# Patient Record
Sex: FEMALE | Employment: FULL TIME | URBAN - METROPOLITAN AREA
[De-identification: names, ages, dates, MRNs, and addresses within clinical notes are randomized per-mention and may not be internally consistent; named-entity substitution may affect disease eponyms.]

---

## 2024-06-04 ENCOUNTER — TELEPHONE (OUTPATIENT)
Dept: LACTATION | Facility: CLINIC | Age: 39
End: 2024-06-04

## 2024-07-06 ENCOUNTER — LAB (OUTPATIENT)
Dept: LAB | Facility: LAB | Age: 39
End: 2024-07-06
Payer: COMMERCIAL

## 2024-07-06 DIAGNOSIS — Z3A.27 27 WEEKS GESTATION OF PREGNANCY (HHS-HCC): ICD-10-CM

## 2024-07-06 LAB
ABO GROUP (TYPE) IN BLOOD: NORMAL
ANTIBODY SCREEN: NORMAL
GLUCOSE 1H P 50 G GLC PO SERPL-MCNC: 189 MG/DL
HBV SURFACE AG SERPL QL IA: NONREACTIVE
HCT VFR BLD AUTO: 33 % (ref 36–46)
HCV AB SER QL: NONREACTIVE
HGB BLD-MCNC: 10.8 G/DL (ref 12–16)
HIV 1+2 AB+HIV1 P24 AG SERPL QL IA: NONREACTIVE
RH FACTOR (ANTIGEN D): NORMAL
TSH SERPL-ACNC: 1.33 MIU/L (ref 0.44–3.98)

## 2024-07-06 PROCEDURE — 85018 HEMOGLOBIN: CPT

## 2024-07-06 PROCEDURE — 86850 RBC ANTIBODY SCREEN: CPT

## 2024-07-06 PROCEDURE — 86900 BLOOD TYPING SEROLOGIC ABO: CPT

## 2024-07-06 PROCEDURE — 86317 IMMUNOASSAY INFECTIOUS AGENT: CPT

## 2024-07-06 PROCEDURE — 86780 TREPONEMA PALLIDUM: CPT

## 2024-07-06 PROCEDURE — 86803 HEPATITIS C AB TEST: CPT

## 2024-07-06 PROCEDURE — 85014 HEMATOCRIT: CPT

## 2024-07-06 PROCEDURE — 87340 HEPATITIS B SURFACE AG IA: CPT

## 2024-07-06 PROCEDURE — 87389 HIV-1 AG W/HIV-1&-2 AB AG IA: CPT

## 2024-07-06 PROCEDURE — 36415 COLL VENOUS BLD VENIPUNCTURE: CPT

## 2024-07-06 PROCEDURE — 86901 BLOOD TYPING SEROLOGIC RH(D): CPT

## 2024-07-06 PROCEDURE — 82947 ASSAY GLUCOSE BLOOD QUANT: CPT

## 2024-07-06 PROCEDURE — 84443 ASSAY THYROID STIM HORMONE: CPT

## 2024-07-07 LAB
RUBV IGG SERPL IA-ACNC: 0.8 IA
RUBV IGG SERPL QL IA: NORMAL
TREPONEMA PALLIDUM IGG+IGM AB [PRESENCE] IN SERUM OR PLASMA BY IMMUNOASSAY: NONREACTIVE

## 2024-07-08 DIAGNOSIS — R73.09 GLUCOSE TOLERANCE TEST ABNORMAL: Primary | ICD-10-CM

## 2024-07-18 ENCOUNTER — APPOINTMENT (OUTPATIENT)
Dept: OBSTETRICS AND GYNECOLOGY | Facility: CLINIC | Age: 39
End: 2024-07-18
Payer: COMMERCIAL

## 2024-07-18 ENCOUNTER — HOSPITAL ENCOUNTER (OUTPATIENT)
Dept: RADIOLOGY | Facility: CLINIC | Age: 39
Discharge: HOME | End: 2024-07-18
Payer: COMMERCIAL

## 2024-07-18 VITALS — BODY MASS INDEX: 25.63 KG/M2 | DIASTOLIC BLOOD PRESSURE: 68 MMHG | WEIGHT: 154 LBS | SYSTOLIC BLOOD PRESSURE: 121 MMHG

## 2024-07-18 DIAGNOSIS — Z3A.30 30 WEEKS GESTATION OF PREGNANCY (HHS-HCC): Primary | ICD-10-CM

## 2024-07-18 DIAGNOSIS — R73.09 GLUCOSE TOLERANCE TEST ABNORMAL: ICD-10-CM

## 2024-07-18 DIAGNOSIS — Z3A.23 23 WEEKS GESTATION OF PREGNANCY (HHS-HCC): ICD-10-CM

## 2024-07-18 DIAGNOSIS — O09.513 SUPERVISION OF ELDERLY PRIMIGRAVIDA, THIRD TRIMESTER (HHS-HCC): ICD-10-CM

## 2024-07-18 PROCEDURE — 76816 OB US FOLLOW-UP PER FETUS: CPT

## 2024-07-18 PROCEDURE — 0501F PRENATAL FLOW SHEET: CPT | Performed by: OBSTETRICS & GYNECOLOGY

## 2024-07-18 PROCEDURE — 76819 FETAL BIOPHYS PROFIL W/O NST: CPT

## 2024-07-18 PROCEDURE — 76816 OB US FOLLOW-UP PER FETUS: CPT | Performed by: OBSTETRICS & GYNECOLOGY

## 2024-07-18 PROCEDURE — 76819 FETAL BIOPHYS PROFIL W/O NST: CPT | Performed by: OBSTETRICS & GYNECOLOGY

## 2024-07-19 ENCOUNTER — APPOINTMENT (OUTPATIENT)
Dept: OBSTETRICS AND GYNECOLOGY | Facility: CLINIC | Age: 39
End: 2024-07-19
Payer: COMMERCIAL

## 2024-07-19 ENCOUNTER — LAB (OUTPATIENT)
Dept: LAB | Facility: LAB | Age: 39
End: 2024-07-19
Payer: COMMERCIAL

## 2024-07-19 DIAGNOSIS — R73.09 GLUCOSE TOLERANCE TEST ABNORMAL: ICD-10-CM

## 2024-07-19 LAB
GLUCOSE 1H P 100 G GLC PO SERPL-MCNC: 189 MG/DL
GLUCOSE 2H P 100 G GLC PO SERPL-MCNC: 160 MG/DL
GLUCOSE 3H P 100 G GLC PO SERPL-MCNC: 88 MG/DL
GLUCOSE P FAST SERPL-MCNC: 91 MG/DL

## 2024-07-19 PROCEDURE — 36415 COLL VENOUS BLD VENIPUNCTURE: CPT

## 2024-07-19 PROCEDURE — 82950 GLUCOSE TEST: CPT

## 2024-07-19 PROCEDURE — 82951 GLUCOSE TOLERANCE TEST (GTT): CPT

## 2024-07-19 PROCEDURE — 82947 ASSAY GLUCOSE BLOOD QUANT: CPT

## 2024-07-19 PROCEDURE — 82952 GTT-ADDED SAMPLES: CPT

## 2024-07-29 ENCOUNTER — APPOINTMENT (OUTPATIENT)
Dept: OBSTETRICS AND GYNECOLOGY | Facility: CLINIC | Age: 39
End: 2024-07-29
Payer: COMMERCIAL

## 2024-07-29 VITALS — WEIGHT: 159 LBS | DIASTOLIC BLOOD PRESSURE: 57 MMHG | BODY MASS INDEX: 26.46 KG/M2 | SYSTOLIC BLOOD PRESSURE: 103 MMHG

## 2024-07-29 DIAGNOSIS — Z3A.31 PREGNANCY WITH 31 COMPLETED WEEKS GESTATION (HHS-HCC): ICD-10-CM

## 2024-07-29 DIAGNOSIS — O24.410 DIET CONTROLLED GESTATIONAL DIABETES MELLITUS (GDM) IN THIRD TRIMESTER (HHS-HCC): Primary | ICD-10-CM

## 2024-07-29 PROCEDURE — 0501F PRENATAL FLOW SHEET: CPT | Performed by: OBSTETRICS & GYNECOLOGY

## 2024-07-29 RX ORDER — FERROUS SULFATE 325(65) MG
65 TABLET, DELAYED RELEASE (ENTERIC COATED) ORAL EVERY OTHER DAY
COMMUNITY

## 2024-07-29 RX ORDER — LANCETS 26 GAUGE
EACH MISCELLANEOUS
Qty: 1 EACH | Refills: 0 | Status: SHIPPED | OUTPATIENT
Start: 2024-07-29 | End: 2025-07-29

## 2024-07-29 RX ORDER — DEXTROSE 4 G
TABLET,CHEWABLE ORAL
Qty: 321.429 EACH | Refills: 1 | Status: SHIPPED | OUTPATIENT
Start: 2024-07-29

## 2024-07-31 ENCOUNTER — TELEPHONE (OUTPATIENT)
Dept: MATERNAL FETAL MEDICINE | Facility: CLINIC | Age: 39
End: 2024-07-31
Payer: COMMERCIAL

## 2024-07-31 NOTE — TELEPHONE ENCOUNTER
Called to schedule Nutrition consult. Left voicemail message with office contact information: [phone].

## 2024-08-01 ENCOUNTER — TELEPHONE (OUTPATIENT)
Dept: MATERNAL FETAL MEDICINE | Facility: CLINIC | Age: 39
End: 2024-08-01
Payer: COMMERCIAL

## 2024-08-02 ENCOUNTER — NUTRITION (OUTPATIENT)
Dept: OBSTETRICS AND GYNECOLOGY | Facility: CLINIC | Age: 39
End: 2024-08-02
Payer: COMMERCIAL

## 2024-08-02 NOTE — PROGRESS NOTES
Nutrition Assessment     Reason for Visit:  Maria T Negrete is a 39 y.o. female who presents for Gestational Diabetes       Food And Nutrient Intake:  Food and Nutrient History  Food and Nutrient History: Telephone Nutrition consult today. No fingersticks yet--supplies called in today. Planning to  tonight.          OB Nutrition Intake  Weeks of Gestation: 32-1  Pre-Pregnancy BMI: 20.8  Overall Weight Change in Pregnancy: 34 lbs  Assessment of Weight Change: Within target range.       Nutrition Diagnosis      Nutrition Diagnosis  Patient has Nutrition Diagnosis: Yes  Nutrition Diagnosis 1: Food and nutrition related knowledge deficit  Related to (1): lack of prior/recent nutrition counseling r/t BG control in pregnancy  As Evidenced by (1): intial nutrition consult    Nutrition Interventions/Recommendations   Food and Nutrition Delivery  Meals & Snacks: Carbohydrate-modified diet  Goals: Recommended Daily carbohydrate distribution:    Breakfast: 1-2 carbohydrate servings (15-30g CHO) --  avoid concentrated sweets/high GI foods: fruit, fruit juices, cold cereal and milk, syrup, jams and jellies, etc.  AM Snack:  1-2 servings   Lunch:  3-4 servings (45-60g CHO)  Midday Snack:  1-2 servings   Dinner: 3-4 servings   HS Snack:  2 servings (30g)    -Be sure to add protein food to carb choices at each meal and snack: cheese, meat, eggs, nuts, peanut butter, etc    -During the day, eat about every 2 - 4 hours.     -Avoid more than 8-10 hours overnight without eating. If more than about 2hrs between dinner and bedtime, have a carb plus protein snack.     -Higher fiber carbohydrates/whole grains are preferred over refined carb choices.    -- Avoid juices and sugar-sweetened beverages. Diet drinks are fine. Try eliminating milk at mealtime if noticing after-meal BG elevations. Unsweetened almond or soymilk beverages are a lower-carb dairy alternative.    -Recommended Dietary Allowance for carbohydrates during pregnancy  is a minimum of 175g/day (11-12 servings) to provide 33g/day for fetal brain development. Many women do well eating a bit less than 175g CHO/day--this is fine, as long as there is no purposeful, severe restriction of carb foods at meals and snacks (ex: Keto or Atkins-style diets for BG control) and no symptoms indicating inadequate carb intake-- loss of weight, feeling dizzy/lightheaded, irritable, confused, excessively hungry, etc.    Education Materials emailed: Diet for GDM; Sample Meals and Snacks; Yogurt Options      Nutrition Monitoring and Evaluation   Biochemical Data, Medical Tests and Procedures  Monitoring and Evaluation Plan: Glucose/endocrine profile  Glucose/Endocrine Profile: Glucose, fasting, Other (Comment)  Criteria: Fasting BG 95 or less; 1hr post-meal  or less. Blood [facility]. Follow-up as needed to assess goal attainment. Modifications based on further assessment and self-blood glucose monitoring results. Pt expresses understanding and agreement.          Time Spent  Prep time on day of patient encounter: 0 minutes  Time spent directly with patient, family or caregiver: 32 minutes  Additional Time Spent on Patient Care Activities: 3 minutes  Documentation Time: 10 minutes  Other Time Spent: 0 minutes  Total: 45 minutes

## 2024-08-14 ENCOUNTER — APPOINTMENT (OUTPATIENT)
Dept: OBSTETRICS AND GYNECOLOGY | Facility: CLINIC | Age: 39
End: 2024-08-14
Payer: COMMERCIAL

## 2024-08-14 VITALS — WEIGHT: 161.2 LBS | BODY MASS INDEX: 26.83 KG/M2 | SYSTOLIC BLOOD PRESSURE: 116 MMHG | DIASTOLIC BLOOD PRESSURE: 78 MMHG

## 2024-08-14 DIAGNOSIS — Z13.79 GENETIC SCREENING: ICD-10-CM

## 2024-08-14 DIAGNOSIS — Z3A.33 33 WEEKS GESTATION OF PREGNANCY (HHS-HCC): Primary | ICD-10-CM

## 2024-08-14 DIAGNOSIS — Z23 NEED FOR TDAP VACCINATION: ICD-10-CM

## 2024-08-14 DIAGNOSIS — Z71.85 ENCOUNTER FOR IMMUNIZATION SAFETY COUNSELING: ICD-10-CM

## 2024-08-14 PROBLEM — O24.410 DIET CONTROLLED GESTATIONAL DIABETES MELLITUS (GDM), ANTEPARTUM (HHS-HCC): Status: ACTIVE | Noted: 2024-08-14

## 2024-08-14 PROBLEM — O09.519 ADVANCED MATERNAL AGE, PRIMIGRAVIDA, ANTEPARTUM (HHS-HCC): Status: ACTIVE | Noted: 2024-08-14

## 2024-08-14 PROCEDURE — 0501F PRENATAL FLOW SHEET: CPT | Performed by: OBSTETRICS & GYNECOLOGY

## 2024-08-14 PROCEDURE — 90471 IMMUNIZATION ADMIN: CPT | Performed by: OBSTETRICS & GYNECOLOGY

## 2024-08-14 PROCEDURE — 90715 TDAP VACCINE 7 YRS/> IM: CPT | Performed by: OBSTETRICS & GYNECOLOGY

## 2024-08-14 ASSESSMENT — ENCOUNTER SYMPTOMS
CONSTITUTIONAL NEGATIVE: 0
PSYCHIATRIC NEGATIVE: 0
NEUROLOGICAL NEGATIVE: 0
ENDOCRINE NEGATIVE: 0
CARDIOVASCULAR NEGATIVE: 0
EYES NEGATIVE: 0
GASTROINTESTINAL NEGATIVE: 0
RESPIRATORY NEGATIVE: 0
ALLERGIC/IMMUNOLOGIC NEGATIVE: 0
MUSCULOSKELETAL NEGATIVE: 0
HEMATOLOGIC/LYMPHATIC NEGATIVE: 0

## 2024-08-14 ASSESSMENT — PATIENT HEALTH QUESTIONNAIRE - PHQ9
2. FEELING DOWN, DEPRESSED OR HOPELESS: NOT AT ALL
SUM OF ALL RESPONSES TO PHQ9 QUESTIONS 1 AND 2: 0
1. LITTLE INTEREST OR PLEASURE IN DOING THINGS: NOT AT ALL

## 2024-08-14 NOTE — PROGRESS NOTES
Meets nutritionist on Friday. She has been checking her blood sugars at home-- blood sugars fasting have been 88-90 and 1-hour have all been <140. Pt has ultrasound scheduled 7/29/2024.    Revewed prenatal labs to date-she desires and does not remember being offered cffDNA.  Will send today.    Problem List Items Addressed This Visit    None  Visit Diagnoses       33 weeks gestation of pregnancy ([facility])    -  Primary    Genetic screening        Relevant Orders    [facility] Prenatal Screen    Encounter for immunization safety counseling        Need for Tdap vaccination

## 2024-08-16 ENCOUNTER — INITIAL PRENATAL (OUTPATIENT)
Dept: MATERNAL FETAL MEDICINE | Facility: CLINIC | Age: 39
End: 2024-08-16
Payer: COMMERCIAL

## 2024-08-16 VITALS — SYSTOLIC BLOOD PRESSURE: 120 MMHG | DIASTOLIC BLOOD PRESSURE: 68 MMHG

## 2024-08-16 DIAGNOSIS — O24.410 DIET CONTROLLED GESTATIONAL DIABETES MELLITUS (GDM), ANTEPARTUM (HHS-HCC): Primary | ICD-10-CM

## 2024-08-16 DIAGNOSIS — Z3A.34 34 WEEKS GESTATION OF PREGNANCY (HHS-HCC): ICD-10-CM

## 2024-08-16 DIAGNOSIS — O24.410 DIET CONTROLLED GESTATIONAL DIABETES MELLITUS (GDM) IN THIRD TRIMESTER (HHS-HCC): ICD-10-CM

## 2024-08-16 PROCEDURE — 99245 OFF/OP CONSLTJ NEW/EST HI 55: CPT | Performed by: NURSE PRACTITIONER

## 2024-08-16 NOTE — ASSESSMENT & PLAN NOTE
Patient was recently diagnosed with gestational diabetes (GDM).  We discussed the pregnancy implications of the diagnosis including increased risk of pre-eclampsia, LGA/macrosomia, shoulder dystocia, stillbirth as well as  hypoglycemia, hyperbilirubinemia and respiratory distress.  We reviewed the importance of glycemic control and its impact on lowering these risks.  Discussed management ranging from dietary changes to pharmacotherapy and that insulin is considered first line therapy rather than oral agents if medication is ultimately needed.  Discussed our recommendation for serial growth ultrasounds and starting  testing at 32 weeks if treatment is required.  We also stressed the potential persistence of impaired glucose tolerance post pregnancy in up to 30% of patients and 50% risk of IGT/T2DM in the next 10 years with an overall lifetime risk of T2DM of 70%. We reviewed the recommendation for postpartum screening at 6 weeks post-partum (can be performed as soon as 2 days after delivery) and, if normal, routine screening every 1-3 years. We did discuss that a healthy diet and maintenance of a normal body weight may reduce those risks    In summary the following is recommended:    1. We will continue to co-manage diabetes via the Bloodsugar line with weekly assessment of glycemic control.  Current regimen is: nutrition   2. We will plan for a follow up [facility] visit at 36 weeks to assess overall control and provide delivery timing recommendations.  3. Recommend serial growth ultrasounds every 4 weeks starting at 28 weeks gestation.  4. Weekly  testing is recommended starting at 32 weeks IF medication is required OR there is a LGA growth pattern.  Twice weekly testing is recommended at 32 weeks if control is suboptimal, there is polyhydramnios, or medication is required AND there is a LGA growth pattern.  5. Delivery is recommended at 39 weeks, though if glycemic control is suboptimal then  delivery at 37-39 weeks may be considered.  6. If the EFW is >4500g at the time of delivery  should be considered.  7. A 2hr GTT is recommended 6 weeks postpartum (can be performed as soon as 2 days postpartum), if normal then screening for T2DM is recommended at least every 3 years.  8. Prior to scheduled delivery the following is recommended regarding insulin management:

## 2024-08-16 NOTE — PROGRESS NOTES
Maria T Negrete is a 39 y.o. here for MFM consultation at 34w1d for GDM, referred by Dr. Clancy     Overall pt reports, she is feeling well.     Pt has been checking BG for the last couple weeks. Reviewed BG log and more than 80% within goal range currently.      issues:  GDM    ObHx-  GynHx-regular periods prior to pregnancy  MedHx-denies   SurgHx-denies   FamHx-multiple family members witg diabetes   SocHx-denies tobacco, ETOH, or illicit drugs   All-NKDA  Meds-PNV, FeSO4 qod     Visit Vitals  /68   LMP 2023   OB Status Pregnant   Smoking Status Never      Gen-NAD  Cardiac- good peripheral perfusion  Respiratory- non-labored breathing  Abdomen- soft, non-tender, gravid   Extremities- symmetrical   Pelvic- deferred      Sono-  Lat US : EFW 1538g (46%), AC 58%    Problem List Items Addressed This Visit          Pregnancy    Diet controlled gestational diabetes mellitus (GDM), antepartum ([facility]) - Primary    Overview     -Shared Care with Dr. Clancy/Cesilia  - MFM Consult completed-   -MFM 36 wk visit  -Serial growth ultrasounds starting at 28 weeks    Last ultrasound: - EFW 46%, AC 58%--> will reschedule growth for ASAP (org. )    Fetal surveillance: if medication initiated   -Weekly at 32 weeks  -Twice weekly at 36 weeks    Current Regimen: nutrition    Delivery Plan: pending 36 week follow up visit  Intrapartum:  GDM protocol  Postpartum: No medication    Recommend PP 2hr gtt and q3yr F/U with PCP for A1C and TSH     24 BG log reviewed and more than 80% within goal range--> nutrition            Current Assessment & Plan     Patient was recently diagnosed with gestational diabetes (GDM).  We discussed the pregnancy implications of the diagnosis including increased risk of pre-eclampsia, LGA/macrosomia, shoulder dystocia, stillbirth as well as  hypoglycemia, hyperbilirubinemia and respiratory distress.  We reviewed the importance of glycemic control and its  impact on lowering these risks.  Discussed management ranging from dietary changes to pharmacotherapy and that insulin is considered first line therapy rather than oral agents if medication is ultimately needed.  Discussed our recommendation for serial growth ultrasounds and starting  testing at 32 weeks if treatment is required.  We also stressed the potential persistence of impaired glucose tolerance post pregnancy in up to 30% of patients and 50% risk of IGT/T2DM in the next 10 years with an overall lifetime risk of T2DM of 70%. We reviewed the recommendation for postpartum screening at 6 weeks post-partum (can be performed as soon as 2 days after delivery) and, if normal, routine screening every 1-3 years. We did discuss that a healthy diet and maintenance of a normal body weight may reduce those risks    In summary the following is recommended:    1. We will continue to co-manage diabetes via the Bloodsugar line with weekly assessment of glycemic control.  Current regimen is: nutrition   2. We will plan for a follow up [facility] visit at 36 weeks to assess overall control and provide delivery timing recommendations.  3. Recommend serial growth ultrasounds every 4 weeks starting at 28 weeks gestation.  4. Weekly  testing is recommended starting at 32 weeks IF medication is required OR there is a LGA growth pattern.  Twice weekly testing is recommended at 32 weeks if control is suboptimal, there is polyhydramnios, or medication is required AND there is a LGA growth pattern.  5. Delivery is recommended at 39 weeks, though if glycemic control is suboptimal then delivery at 37-39 weeks may be considered.  6. If the EFW is >4500g at the time of delivery  should be considered.  7. A 2hr GTT is recommended 6 weeks postpartum (can be performed as soon as 2 days postpartum), if normal then screening for T2DM is recommended at least every 3 years.  8. Prior to scheduled delivery the following is  recommended regarding insulin management:           34 weeks gestation of pregnancy ([facility])    Overview     - Primary OB- Dr. Clancy          Other Visit Diagnoses       Diet controlled gestational diabetes mellitus (GDM) in third trimester ([facility])                 MFM F/U x2 wks for delivery planning   Reschedule growth from 8/29 to soonest available     BAR Loya-CNP  [employer]   Maternal Fetal Medicine    I spent 55 minutes in consultation with pt coordinating m

## 2024-08-20 ENCOUNTER — TELEPHONE (OUTPATIENT)
Dept: MATERNAL FETAL MEDICINE | Facility: HOSPITAL | Age: 39
End: 2024-08-20
Payer: COMMERCIAL

## 2024-08-20 NOTE — TELEPHONE ENCOUNTER
Blood Sugar Support Line Communication   Patient is new to the Bloods Sugar Support Line    Communicated with the patient on 8/20/2024   She has Gestational Diabetes @ 34w5d    The patient checks her sugars fasting and 1 hour after meals. Her current regimen is as follows:  Nutrition plan      The patient's reported blood sugars appear well controlled, with 80% within the goal range. Goal range glucose is Fasting <95, 1 hr after meals <140.     Is limiting variety to ensure goal range BG.  Provided education -  that while optimizing glucose levels to goal range is a key component of Gestational Diabetes care, significantly lower glucose levels and/or carbohydrate restriction offers no advantage. A balanced nutrition plan of nutrients, including modest carbohydrate intake is recommended.    No changes to her current treatment plan are indicated at this time.    Patient understands to submit sugar log for review weekly through the Blood Sugar Line @ [phone] or via email to [email or web address] to help optimize glucose control.    I spent approximately 8-10 minutes on the phone with the patient

## 2024-08-21 ENCOUNTER — HOSPITAL ENCOUNTER (OUTPATIENT)
Dept: RADIOLOGY | Facility: CLINIC | Age: 39
Discharge: HOME | End: 2024-08-21
Payer: COMMERCIAL

## 2024-08-21 DIAGNOSIS — O24.410 DIET CONTROLLED GESTATIONAL DIABETES MELLITUS (GDM) IN THIRD TRIMESTER (HHS-HCC): ICD-10-CM

## 2024-08-21 PROCEDURE — 76816 OB US FOLLOW-UP PER FETUS: CPT | Performed by: OBSTETRICS & GYNECOLOGY

## 2024-08-21 PROCEDURE — 76819 FETAL BIOPHYS PROFIL W/O NST: CPT

## 2024-08-21 PROCEDURE — 76816 OB US FOLLOW-UP PER FETUS: CPT

## 2024-08-21 PROCEDURE — 76819 FETAL BIOPHYS PROFIL W/O NST: CPT | Performed by: OBSTETRICS & GYNECOLOGY

## 2024-08-27 ENCOUNTER — PATIENT MESSAGE (OUTPATIENT)
Dept: MATERNAL FETAL MEDICINE | Facility: HOSPITAL | Age: 39
End: 2024-08-27
Payer: COMMERCIAL

## 2024-08-29 ENCOUNTER — APPOINTMENT (OUTPATIENT)
Dept: MATERNAL FETAL MEDICINE | Facility: HOSPITAL | Age: 39
End: 2024-08-29
Payer: COMMERCIAL

## 2024-08-29 ENCOUNTER — APPOINTMENT (OUTPATIENT)
Dept: OBSTETRICS AND GYNECOLOGY | Facility: CLINIC | Age: 39
End: 2024-08-29
Payer: COMMERCIAL

## 2024-08-29 ENCOUNTER — APPOINTMENT (OUTPATIENT)
Dept: RADIOLOGY | Facility: CLINIC | Age: 39
End: 2024-08-29
Payer: COMMERCIAL

## 2024-08-29 VITALS — BODY MASS INDEX: 26.96 KG/M2 | DIASTOLIC BLOOD PRESSURE: 64 MMHG | SYSTOLIC BLOOD PRESSURE: 106 MMHG | WEIGHT: 162 LBS

## 2024-08-29 DIAGNOSIS — O24.410 DIET CONTROLLED GESTATIONAL DIABETES MELLITUS (GDM) IN THIRD TRIMESTER (HHS-HCC): ICD-10-CM

## 2024-08-29 DIAGNOSIS — Z3A.36 36 WEEKS GESTATION OF PREGNANCY (HHS-HCC): Primary | ICD-10-CM

## 2024-08-29 PROCEDURE — 87081 CULTURE SCREEN ONLY: CPT

## 2024-08-29 PROCEDURE — 0501F PRENATAL FLOW SHEET: CPT | Performed by: OBSTETRICS & GYNECOLOGY

## 2024-09-01 LAB — GP B STREP GENITAL QL CULT: NORMAL

## 2024-09-03 ENCOUNTER — PATIENT MESSAGE (OUTPATIENT)
Dept: MATERNAL FETAL MEDICINE | Facility: CLINIC | Age: 39
End: 2024-09-03
Payer: COMMERCIAL

## 2024-09-03 ENCOUNTER — APPOINTMENT (OUTPATIENT)
Dept: MATERNAL FETAL MEDICINE | Facility: CLINIC | Age: 39
End: 2024-09-03
Payer: COMMERCIAL

## 2024-09-03 ENCOUNTER — ROUTINE PRENATAL (OUTPATIENT)
Dept: MATERNAL FETAL MEDICINE | Facility: CLINIC | Age: 39
End: 2024-09-03
Payer: COMMERCIAL

## 2024-09-03 VITALS — BODY MASS INDEX: 27.12 KG/M2 | WEIGHT: 163 LBS | DIASTOLIC BLOOD PRESSURE: 75 MMHG | SYSTOLIC BLOOD PRESSURE: 111 MMHG

## 2024-09-03 DIAGNOSIS — Z3A.36 36 WEEKS GESTATION OF PREGNANCY (HHS-HCC): Primary | ICD-10-CM

## 2024-09-03 DIAGNOSIS — O24.410 DIET CONTROLLED GESTATIONAL DIABETES MELLITUS (GDM), ANTEPARTUM (HHS-HCC): ICD-10-CM

## 2024-09-03 DIAGNOSIS — O09.519 ADVANCED MATERNAL AGE, PRIMIGRAVIDA, ANTEPARTUM (HHS-HCC): ICD-10-CM

## 2024-09-03 PROCEDURE — 99214 OFFICE O/P EST MOD 30 MIN: CPT | Performed by: NURSE PRACTITIONER

## 2024-09-03 NOTE — PROGRESS NOTES
Maria T Negrete is a 39 y.o. here for MFM F/U consultation at 36w5d for GDM, referred by Dr. Clancy     Overall pt reports, she is feeling well. Denies VB, LOF, or CTXs.     Pt has been checking BG four times daily. Reviewed BG log and more than 80% within goal range currently.      issues:  GDM- nutrition controlled       Visit Vitals  /75   Wt 73.9 kg (163 lb)   LMP 2023   BMI 27.12 kg/m²   OB Status Pregnant   Smoking Status Never   BSA 1.84 m²      Gen-NAD  Cardiac- good peripheral perfusion  Respiratory- non-labored breathing  Abdomen- soft, non-tender, gravid   Extremities- symmetrical   Pelvic- deferred      Sono-  : EFW 64%, AC 85%, BPP - see report for full details  Lat  : EFW 1538g (46%), AC 58%    Problem List Items Addressed This Visit          Pregnancy    36 weeks gestation of pregnancy ([facility]) - Primary    Overview     - Primary OB- Dr. Clancy  -Counseled on RSV vaccine today (9/3) given close to point where she would not be able to receive this. Pt is considering. Will think about it. Aware that tomorrow () would be her last day eligible for vaccine.   -Desires to breastfeed   -Delivery planning- recommend 39 wk delivery. Pt asking for possibility of  delivery. Counseled on MOD and risk vs benefits of vaginal vs primary C/D           Current Assessment & Plan     - upon request: Reviewed and counseled on risk, benefits, and alternatives --> especially discussed recommended MOD is vaginal and  increases risk for infection.   -Pt considering  as option and will discuss with primary OB provider           Advanced maternal age, primigravida, antepartum ([facility])    Overview     cffDNA at 34 week visit  Weekly  testing now until delivery given AMA          Diet controlled gestational diabetes mellitus (GDM), antepartum ([facility])    Overview     -Shared Care with Dr. Clancy/Cesilia  - MFM Consult completed-   -MFM 36 wk  visit  -Serial growth ultrasounds starting at 28 weeks    Last ultrasound: :  EFW 64%, AC 85%, BPP - EFW 46%, AC 58%--> will reschedule growth for ASAP (org. )    Fetal surveillance: if medication initiated   -Weekly at 32 weeks  -Twice weekly at 36 weeks    Current Regimen: nutrition    Delivery Plan: Recommend 39 wk delivery- MOD --> pt considering primary . See counseling   Intrapartum:  GDM protocol  Postpartum: No medication    Recommend PP 2hr gtt and q3yr F/U with PCP for A1C and TSH     24 BG log reviewed and more than 80% within goal range--> nutrition   2024 Nutrition plan    2024 Nutrition plan    9/3/2024 : nutrition                 Continued weekly communication with blood sugar line   -Delivery recommended at 39 wks - considering MOD (see counseling above)  -Weekly NST until delivery recommended given BAR Al-CNP  [employer]   Maternal Fetal Medicine

## 2024-09-03 NOTE — ASSESSMENT & PLAN NOTE
- upon request: Reviewed and counseled on risk, benefits, and alternatives --> especially discussed recommended MOD is vaginal and  increases risk for infection.   -Pt considering  as option and will discuss with primary OB provider

## 2024-09-04 ENCOUNTER — ROUTINE PRENATAL (OUTPATIENT)
Dept: OBSTETRICS AND GYNECOLOGY | Facility: CLINIC | Age: 39
End: 2024-09-04
Payer: COMMERCIAL

## 2024-09-04 ENCOUNTER — APPOINTMENT (OUTPATIENT)
Dept: OBSTETRICS AND GYNECOLOGY | Facility: CLINIC | Age: 39
End: 2024-09-04
Payer: COMMERCIAL

## 2024-09-04 VITALS — BODY MASS INDEX: 27.62 KG/M2 | DIASTOLIC BLOOD PRESSURE: 77 MMHG | SYSTOLIC BLOOD PRESSURE: 133 MMHG | WEIGHT: 166 LBS

## 2024-09-04 DIAGNOSIS — O24.410 DIET CONTROLLED GESTATIONAL DIABETES MELLITUS (GDM), ANTEPARTUM (HHS-HCC): ICD-10-CM

## 2024-09-04 DIAGNOSIS — Z3A.36 36 WEEKS GESTATION OF PREGNANCY (HHS-HCC): ICD-10-CM

## 2024-09-04 DIAGNOSIS — O09.519 ADVANCED MATERNAL AGE, PRIMIGRAVIDA, ANTEPARTUM (HHS-HCC): ICD-10-CM

## 2024-09-04 PROCEDURE — 0501F PRENATAL FLOW SHEET: CPT | Performed by: OBSTETRICS & GYNECOLOGY

## 2024-09-04 PROCEDURE — 59025 FETAL NON-STRESS TEST: CPT | Performed by: OBSTETRICS & GYNECOLOGY

## 2024-09-04 ASSESSMENT — ENCOUNTER SYMPTOMS
LOSS OF SENSATION IN FEET: 0
DEPRESSION: 0
OCCASIONAL FEELINGS OF UNSTEADINESS: 0

## 2024-09-04 NOTE — PROGRESS NOTES
Pt saw Aneta Jackson yesterday-- continues to be diet controlled with good glucose control.  NST performed and reactive today-- will continue weekly for AMA.    Last ultrasound 2024-- EFW 64%.    We spent additional time reviewing the risks/benefits of IOL and planned vaginal delivery vs primary elective  delivery.  Discussed that planned vaginal delivery is associated with less blood loss, lower pain, faster recovery, and less TTN of the , while potentially increasing the risk of pelvic floor dysfunction/future incontinence.  Primary LTCS associated with increased risks of the above and perhaps lower incidence of pelvic floor dysfunction. Also discussed impact of future fertility plans and size of family given the risks of subsequent  deliveries.  Pt well informed and will consider her options-- will schedule IOL or LTCS at next visit.    Problem List Items Addressed This Visit          Pregnancy    Diet controlled gestational diabetes mellitus (GDM), antepartum ([facility])    Overview     -Shared Care with Dr. Clancy/Cesilia  - [facility] Consult completed-   -[facility] 36 wk visit  -Serial growth ultrasounds starting at 28 weeks    Last ultrasound: :  EFW 64%, AC 85%, BPP - EFW 46%, AC 58%--> will reschedule growth for ASAP (org. )    Fetal surveillance: if medication initiated   -Weekly at 32 weeks  -Twice weekly at 36 weeks    Current Regimen: nutrition    Delivery Plan: Recommend 39 wk delivery- MOD --> pt considering primary . See counseling   Intrapartum:  GDM protocol  Postpartum: No medication    Recommend PP 2hr gtt and q3yr F/U with PCP for A1C and TSH     24 BG log reviewed and more than 80% within goal range--> nutrition   2024 Nutrition plan    2024 Nutrition plan    9/3/2024 : nutrition           Advanced maternal age, primigravida, antepartum ([facility])    Overview     cffDNA at 34 week visit  Weekly  testing now until delivery given  AMA          36 weeks gestation of pregnancy ([facility])    Overview     - Primary OB- Dr. Clancy  -Counseled on RSV vaccine today (9/3) given close to point where she would not be able to receive this. Pt is considering. Will think about it. Aware that tomorrow () would be her last day eligible for vaccine.   -Desires to breastfeed   -Delivery planning- recommend 39 wk delivery. Pt asking for possibility of  delivery. Counseled on MOD and risk vs benefits of vaginal vs primary C/D (see note 2024)-- pt considering    Desired provider in labor: [] CNM  [] Physician  [x] Blood Products: [x] Yes, accepts [] No, needs counseling  [x] Initial BMI: 20.80   [x] Prenatal Labs: completed  [x] Cervical Cancer Screening up to date Needs PP PAP (last PAP  ASCUS +HPV)  [x] Rh status:   A pos  [x] Genetic Screening:  did not complete cffDNA  [x] NT US: (11-13 wks) too late  [x] Baby ASA (if indicated): not offered  [x] Pregnancy dated by: LMP and ultrasound in [place] at 19 weeks    [x] Anatomy US: (19-20 wks): normal  [] Federal Sterilization consent signed (if indicated):  [x] 1hr GCT at 24-28wks: 189  3-hour GTT 90/189/160/88  [x] Rhogam (if indicated):  NA  [x] Fetal Surveillance (if indicated): weekly NSTs for AMA  [x] Tdap (27-32 wks, may be given up to 36 wks if initial window missed): given 2024  [x] RSV (32-36 wks) (Sept. to end ):  NA  [] Flu Vaccine:    [x] Breastfeeding: yes  [] Postpartum Birth control method:   [x] GBS at 36 - 37 wks: neg  [x] 39 weeks discussion of IOL vs. Expectant management: 39 weeks  [] Mode of delivery ( anticipated ):

## 2024-09-06 ENCOUNTER — APPOINTMENT (OUTPATIENT)
Dept: MATERNAL FETAL MEDICINE | Facility: CLINIC | Age: 39
End: 2024-09-06
Payer: COMMERCIAL

## 2024-09-10 ENCOUNTER — APPOINTMENT (OUTPATIENT)
Dept: OBSTETRICS AND GYNECOLOGY | Facility: CLINIC | Age: 39
End: 2024-09-10
Payer: COMMERCIAL

## 2024-09-10 ENCOUNTER — TELEPHONE (OUTPATIENT)
Dept: OBSTETRICS AND GYNECOLOGY | Facility: CLINIC | Age: 39
End: 2024-09-10

## 2024-09-10 ENCOUNTER — PATIENT MESSAGE (OUTPATIENT)
Dept: MATERNAL FETAL MEDICINE | Facility: HOSPITAL | Age: 39
End: 2024-09-10

## 2024-09-10 VITALS — BODY MASS INDEX: 27.67 KG/M2 | SYSTOLIC BLOOD PRESSURE: 112 MMHG | DIASTOLIC BLOOD PRESSURE: 65 MMHG | WEIGHT: 166.3 LBS

## 2024-09-10 DIAGNOSIS — Z3A.37 37 WEEKS GESTATION OF PREGNANCY (HHS-HCC): Primary | ICD-10-CM

## 2024-09-10 DIAGNOSIS — O24.410 DIET CONTROLLED GESTATIONAL DIABETES MELLITUS (GDM), ANTEPARTUM (HHS-HCC): ICD-10-CM

## 2024-09-10 DIAGNOSIS — O09.519 ADVANCED MATERNAL AGE, PRIMIGRAVIDA, ANTEPARTUM (HHS-HCC): ICD-10-CM

## 2024-09-10 PROCEDURE — 0501F PRENATAL FLOW SHEET: CPT | Performed by: OBSTETRICS & GYNECOLOGY

## 2024-09-10 PROCEDURE — 59025 FETAL NON-STRESS TEST: CPT | Performed by: OBSTETRICS & GYNECOLOGY

## 2024-09-10 NOTE — PROGRESS NOTES
Pt feeling well.  Reports all blood sugars are in target range. NST reactive.  Reviewed delivery planning and she would like IOL in 39th weeks-- scheduling task sent to Fiona/Aydee.  O next week for visit/NST.    Problem List Items Addressed This Visit          Pregnancy    Diet controlled gestational diabetes mellitus (GDM), antepartum ([facility])    Overview     -Shared Care with Dr. Clancy/Strand  - MFM Consult completed-   -MFM 36 wk visit  -Serial growth ultrasounds starting at 28 weeks    Last ultrasound: :  EFW 64%, AC 85%, BPP - EFW 46%, AC 58%--> will reschedule growth for ASAP (org. )    Fetal surveillance: if medication initiated   -Weekly at 32 weeks  -Twice weekly at 36 weeks    Current Regimen: nutrition    Delivery Plan: Recommend 39 wk delivery- MOD --> pt considering primary . See counseling   Intrapartum:  GDM protocol  Postpartum: No medication    Recommend PP 2hr gtt and q3yr F/U with PCP for A1C and TSH     24 BG log reviewed and more than 80% within goal range--> nutrition   2024 Nutrition plan    2024 Nutrition plan    9/3/2024 : nutrition           Relevant Orders    Labor Induction    Advanced maternal age, primigravida, antepartum ([facility])    Overview     cffDNA at 34 week visit  Weekly  testing now until delivery given AMA          Relevant Orders    Labor Induction    37 weeks gestation of pregnancy ([facility]) - Primary    Overview     - Primary OB- Dr. Clancy  -Counseled on RSV vaccine today (9/3) given close to point where she would not be able to receive this. Pt is considering. Will think about it. Aware that tomorrow () would be her last day eligible for vaccine.   -Desires to breastfeed   -Delivery planning- recommend 39 wk delivery. Pt asking for possibility of  delivery. Counseled on MOD and risk vs benefits of vaginal vs primary C/D (see note 2024)-- pt considering    Desired provider in labor: [] CNM  []  Physician  [x] Blood Products: [x] Yes, accepts [] No, needs counseling  [x] Initial BMI: 20.80   [x] Prenatal Labs: completed  [x] Cervical Cancer Screening up to date Needs PP PAP (last PAP 2018 ASCUS +HPV)  [x] Rh status:   A pos  [x] Genetic Screening:  did not complete cffDNA  [x] NT US: (11-13 wks) too late  [x] Baby ASA (if indicated): not offered  [x] Pregnancy dated by: LMP and ultrasound in [place] at 19 weeks    [x] Anatomy US: (19-20 wks): normal  [] Federal Sterilization consent signed (if indicated):  [x] 1hr GCT at 24-28wks: 189  3-hour GTT 90/189/160/88  [x] Rhogam (if indicated):  NA  [x] Fetal Surveillance (if indicated): weekly NSTs for AMA  [x] Tdap (27-32 wks, may be given up to 36 wks if initial window missed): given 8/14/2024  [x] RSV (32-36 wks) (Sept. to end of Jan):  NA  [] Flu Vaccine:    [x] Breastfeeding: yes  [] Postpartum Birth control method:   [x] GBS at 36 - 37 wks: neg  [x] 39 weeks discussion of IOL vs. Expectant management: IOL scheduled for Chaitanya 9/23 at 0800  [] Mode of delivery ( anticipated ):

## 2024-09-10 NOTE — TELEPHONE ENCOUNTER
Called induction scheduling to change patient's scheduled induction time  Identified patient by name and MRN  Patient moved to 8pm on Monday,  at [facility].    Called patient to inform her of time change  Identified by name and   Informed patient of change  Patient verbalized understanding, no questions/concerns at this time.    LESLIE ReichN RN      Maria T Negrete Do [address] Clinical Support Staff  Phone Number: [phone]     Hi Dr. Lomas,    Thank You! Is it possible to have an evening induction time for the same day?    MD Thu Krishna, RN  Phone Number: [phone]     Yes-- that is fine. I might also check with Fiona and Aydee but I think they said they had 1800 and 2000 open that day, so whatever is fine with me!

## 2024-09-12 ENCOUNTER — APPOINTMENT (OUTPATIENT)
Dept: OBSTETRICS AND GYNECOLOGY | Facility: CLINIC | Age: 39
End: 2024-09-12
Payer: COMMERCIAL

## 2024-09-16 ENCOUNTER — APPOINTMENT (OUTPATIENT)
Dept: OBSTETRICS AND GYNECOLOGY | Facility: CLINIC | Age: 39
End: 2024-09-16
Payer: COMMERCIAL

## 2024-09-16 VITALS — BODY MASS INDEX: 27.46 KG/M2 | DIASTOLIC BLOOD PRESSURE: 67 MMHG | WEIGHT: 165 LBS | SYSTOLIC BLOOD PRESSURE: 108 MMHG

## 2024-09-16 DIAGNOSIS — Z3A.38 38 WEEKS GESTATION OF PREGNANCY (HHS-HCC): Primary | ICD-10-CM

## 2024-09-16 DIAGNOSIS — O24.410 DIET CONTROLLED GESTATIONAL DIABETES MELLITUS (GDM) IN THIRD TRIMESTER (HHS-HCC): ICD-10-CM

## 2024-09-16 PROCEDURE — 99213 OFFICE O/P EST LOW 20 MIN: CPT | Performed by: OBSTETRICS & GYNECOLOGY

## 2024-09-19 NOTE — PROGRESS NOTES
Patient is 38 weeks 4 days she is here for prenatal visit she reports good fetal movements NST today reactive  Patient is scheduled for induction next week  Patient suffers from diabetes well-controlled diabetes

## 2024-09-21 ENCOUNTER — HOSPITAL ENCOUNTER (INPATIENT)
Facility: HOSPITAL | Age: 39
End: 2024-09-21
Attending: STUDENT IN AN ORGANIZED HEALTH CARE EDUCATION/TRAINING PROGRAM | Admitting: STUDENT IN AN ORGANIZED HEALTH CARE EDUCATION/TRAINING PROGRAM
Payer: COMMERCIAL

## 2024-09-21 ENCOUNTER — ANESTHESIA (OUTPATIENT)
Dept: OBSTETRICS AND GYNECOLOGY | Facility: HOSPITAL | Age: 39
End: 2024-09-21
Payer: COMMERCIAL

## 2024-09-21 ENCOUNTER — ANESTHESIA EVENT (OUTPATIENT)
Dept: OBSTETRICS AND GYNECOLOGY | Facility: HOSPITAL | Age: 39
End: 2024-09-21
Payer: COMMERCIAL

## 2024-09-21 PROBLEM — Z3A.38 38 WEEKS GESTATION OF PREGNANCY (HHS-HCC): Status: ACTIVE | Noted: 2024-08-16

## 2024-09-21 PROBLEM — Z37.9 NORMAL LABOR (HHS-HCC): Status: ACTIVE | Noted: 2024-09-21

## 2024-09-21 LAB
ABO GROUP (TYPE) IN BLOOD: NORMAL
ANTIBODY SCREEN: NORMAL
ERYTHROCYTE [DISTWIDTH] IN BLOOD BY AUTOMATED COUNT: 13.2 % (ref 11.5–14.5)
GLUCOSE BLD MANUAL STRIP-MCNC: 101 MG/DL (ref 74–99)
GLUCOSE BLD MANUAL STRIP-MCNC: 121 MG/DL (ref 74–99)
GLUCOSE BLD MANUAL STRIP-MCNC: 169 MG/DL (ref 74–99)
GLUCOSE BLD MANUAL STRIP-MCNC: 92 MG/DL (ref 74–99)
HCT VFR BLD AUTO: 39 % (ref 36–46)
HGB BLD-MCNC: 13.1 G/DL (ref 12–16)
MCH RBC QN AUTO: 30.3 PG (ref 26–34)
MCHC RBC AUTO-ENTMCNC: 33.6 G/DL (ref 32–36)
MCV RBC AUTO: 90 FL (ref 80–100)
NRBC BLD-RTO: 0 /100 WBCS (ref 0–0)
PLATELET # BLD AUTO: 216 X10*3/UL (ref 150–450)
RBC # BLD AUTO: 4.32 X10*6/UL (ref 4–5.2)
RH FACTOR (ANTIGEN D): NORMAL
WBC # BLD AUTO: 12.2 X10*3/UL (ref 4.4–11.3)

## 2024-09-21 PROCEDURE — 2500000002 HC RX 250 W HCPCS SELF ADMINISTERED DRUGS (ALT 637 FOR MEDICARE OP, ALT 636 FOR OP/ED): Performed by: STUDENT IN AN ORGANIZED HEALTH CARE EDUCATION/TRAINING PROGRAM

## 2024-09-21 PROCEDURE — 2500000004 HC RX 250 GENERAL PHARMACY W/ HCPCS (ALT 636 FOR OP/ED): Performed by: NURSE ANESTHETIST, CERTIFIED REGISTERED

## 2024-09-21 PROCEDURE — 82947 ASSAY GLUCOSE BLOOD QUANT: CPT

## 2024-09-21 PROCEDURE — 86780 TREPONEMA PALLIDUM: CPT | Mod: AHULAB | Performed by: STUDENT IN AN ORGANIZED HEALTH CARE EDUCATION/TRAINING PROGRAM

## 2024-09-21 PROCEDURE — 51701 INSERT BLADDER CATHETER: CPT

## 2024-09-21 PROCEDURE — 3700000014 EPIDURAL BLOCK: Performed by: NURSE ANESTHETIST, CERTIFIED REGISTERED

## 2024-09-21 PROCEDURE — 36415 COLL VENOUS BLD VENIPUNCTURE: CPT | Performed by: STUDENT IN AN ORGANIZED HEALTH CARE EDUCATION/TRAINING PROGRAM

## 2024-09-21 PROCEDURE — 1220000001 HC OB SEMI-PRIVATE ROOM DAILY

## 2024-09-21 PROCEDURE — 85027 COMPLETE CBC AUTOMATED: CPT | Performed by: STUDENT IN AN ORGANIZED HEALTH CARE EDUCATION/TRAINING PROGRAM

## 2024-09-21 PROCEDURE — 86901 BLOOD TYPING SEROLOGIC RH(D): CPT | Performed by: STUDENT IN AN ORGANIZED HEALTH CARE EDUCATION/TRAINING PROGRAM

## 2024-09-21 PROCEDURE — 2500000004 HC RX 250 GENERAL PHARMACY W/ HCPCS (ALT 636 FOR OP/ED): Performed by: STUDENT IN AN ORGANIZED HEALTH CARE EDUCATION/TRAINING PROGRAM

## 2024-09-21 PROCEDURE — A59409 PR OBSTETRICAL CARE,VAG DELIV ONLY: Performed by: NURSE ANESTHETIST, CERTIFIED REGISTERED

## 2024-09-21 RX ORDER — METOCLOPRAMIDE 10 MG/1
10 TABLET ORAL EVERY 6 HOURS PRN
Status: DISCONTINUED | OUTPATIENT
Start: 2024-09-21 | End: 2024-09-22

## 2024-09-21 RX ORDER — DEXTROSE 40 %
30 GEL (GRAM) ORAL
Status: DISCONTINUED | OUTPATIENT
Start: 2024-09-21 | End: 2024-09-22

## 2024-09-21 RX ORDER — OXYTOCIN 10 [USP'U]/ML
10 INJECTION, SOLUTION INTRAMUSCULAR; INTRAVENOUS ONCE AS NEEDED
Status: DISCONTINUED | OUTPATIENT
Start: 2024-09-21 | End: 2024-09-22

## 2024-09-21 RX ORDER — MISOPROSTOL 200 UG/1
800 TABLET ORAL ONCE AS NEEDED
Status: DISCONTINUED | OUTPATIENT
Start: 2024-09-21 | End: 2024-09-22

## 2024-09-21 RX ORDER — ONDANSETRON 4 MG/1
4 TABLET, FILM COATED ORAL EVERY 6 HOURS PRN
Status: DISCONTINUED | OUTPATIENT
Start: 2024-09-21 | End: 2024-09-22

## 2024-09-21 RX ORDER — ONDANSETRON HYDROCHLORIDE 2 MG/ML
4 INJECTION, SOLUTION INTRAVENOUS EVERY 6 HOURS PRN
Status: DISCONTINUED | OUTPATIENT
Start: 2024-09-21 | End: 2024-09-22

## 2024-09-21 RX ORDER — DEXTROSE 40 %
15 GEL (GRAM) ORAL
Status: DISCONTINUED | OUTPATIENT
Start: 2024-09-21 | End: 2024-09-22

## 2024-09-21 RX ORDER — CARBOPROST TROMETHAMINE 250 UG/ML
250 INJECTION, SOLUTION INTRAMUSCULAR ONCE AS NEEDED
Status: DISCONTINUED | OUTPATIENT
Start: 2024-09-21 | End: 2024-09-22

## 2024-09-21 RX ORDER — FENTANYL/ROPIVACAINE/NS/PF 2MCG/ML-.2
0-25 PLASTIC BAG, INJECTION (ML) INJECTION CONTINUOUS
Status: DISCONTINUED | OUTPATIENT
Start: 2024-09-21 | End: 2024-09-22

## 2024-09-21 RX ORDER — METHYLERGONOVINE MALEATE 0.2 MG/ML
0.2 INJECTION INTRAVENOUS ONCE AS NEEDED
Status: DISCONTINUED | OUTPATIENT
Start: 2024-09-21 | End: 2024-09-22

## 2024-09-21 RX ORDER — INSULIN LISPRO 100 [IU]/ML
0-10 INJECTION, SOLUTION INTRAVENOUS; SUBCUTANEOUS EVERY 4 HOURS
Status: DISCONTINUED | OUTPATIENT
Start: 2024-09-21 | End: 2024-09-22

## 2024-09-21 RX ORDER — LABETALOL HYDROCHLORIDE 5 MG/ML
20 INJECTION, SOLUTION INTRAVENOUS ONCE AS NEEDED
Status: DISCONTINUED | OUTPATIENT
Start: 2024-09-21 | End: 2024-09-22

## 2024-09-21 RX ORDER — TERBUTALINE SULFATE 1 MG/ML
0.25 INJECTION SUBCUTANEOUS ONCE AS NEEDED
Status: DISCONTINUED | OUTPATIENT
Start: 2024-09-21 | End: 2024-09-22

## 2024-09-21 RX ORDER — DEXTROSE 50 % IN WATER (D50W) INTRAVENOUS SYRINGE
25
Status: DISCONTINUED | OUTPATIENT
Start: 2024-09-21 | End: 2024-09-22

## 2024-09-21 RX ORDER — HYDRALAZINE HYDROCHLORIDE 20 MG/ML
5 INJECTION INTRAMUSCULAR; INTRAVENOUS ONCE AS NEEDED
Status: DISCONTINUED | OUTPATIENT
Start: 2024-09-21 | End: 2024-09-22

## 2024-09-21 RX ORDER — TRANEXAMIC ACID 100 MG/ML
1000 INJECTION, SOLUTION INTRAVENOUS ONCE AS NEEDED
Status: DISCONTINUED | OUTPATIENT
Start: 2024-09-21 | End: 2024-09-22

## 2024-09-21 RX ORDER — NIFEDIPINE 10 MG/1
10 CAPSULE ORAL ONCE AS NEEDED
Status: DISCONTINUED | OUTPATIENT
Start: 2024-09-21 | End: 2024-09-22

## 2024-09-21 RX ORDER — LIDOCAINE HYDROCHLORIDE 10 MG/ML
30 INJECTION, SOLUTION INFILTRATION; PERINEURAL ONCE AS NEEDED
Status: DISCONTINUED | OUTPATIENT
Start: 2024-09-21 | End: 2024-09-22

## 2024-09-21 RX ORDER — LOPERAMIDE HYDROCHLORIDE 2 MG/1
4 CAPSULE ORAL EVERY 2 HOUR PRN
Status: DISCONTINUED | OUTPATIENT
Start: 2024-09-21 | End: 2024-09-22

## 2024-09-21 RX ORDER — METOCLOPRAMIDE HYDROCHLORIDE 5 MG/ML
10 INJECTION INTRAMUSCULAR; INTRAVENOUS EVERY 6 HOURS PRN
Status: DISCONTINUED | OUTPATIENT
Start: 2024-09-21 | End: 2024-09-22

## 2024-09-21 RX ORDER — OXYTOCIN/0.9 % SODIUM CHLORIDE 30/500 ML
60 PLASTIC BAG, INJECTION (ML) INTRAVENOUS ONCE AS NEEDED
Status: COMPLETED | OUTPATIENT
Start: 2024-09-21 | End: 2024-09-22

## 2024-09-21 RX ORDER — SODIUM CHLORIDE, SODIUM LACTATE, POTASSIUM CHLORIDE, CALCIUM CHLORIDE 600; 310; 30; 20 MG/100ML; MG/100ML; MG/100ML; MG/100ML
125 INJECTION, SOLUTION INTRAVENOUS CONTINUOUS
Status: DISCONTINUED | OUTPATIENT
Start: 2024-09-21 | End: 2024-09-22

## 2024-09-21 RX ADMIN — Medication 12 ML/HR: at 21:14

## 2024-09-21 RX ADMIN — INSULIN LISPRO 2 UNITS: 100 INJECTION, SOLUTION INTRAVENOUS; SUBCUTANEOUS at 20:05

## 2024-09-21 RX ADMIN — Medication 6 ML: at 21:07

## 2024-09-21 RX ADMIN — INSULIN LISPRO 4 UNITS: 100 INJECTION, SOLUTION INTRAVENOUS; SUBCUTANEOUS at 16:30

## 2024-09-21 RX ADMIN — SODIUM CHLORIDE, POTASSIUM CHLORIDE, SODIUM LACTATE AND CALCIUM CHLORIDE 500 ML: 600; 310; 30; 20 INJECTION, SOLUTION INTRAVENOUS at 20:35

## 2024-09-21 SDOH — SOCIAL STABILITY: SOCIAL INSECURITY: PHYSICAL ABUSE: DENIES

## 2024-09-21 SDOH — HEALTH STABILITY: MENTAL HEALTH: WISH TO BE DEAD (PAST 1 MONTH): NO

## 2024-09-21 SDOH — HEALTH STABILITY: MENTAL HEALTH: WERE YOU ABLE TO COMPLETE ALL THE BEHAVIORAL HEALTH SCREENINGS?: YES

## 2024-09-21 SDOH — SOCIAL STABILITY: SOCIAL INSECURITY: VERBAL ABUSE: DENIES

## 2024-09-21 SDOH — ECONOMIC STABILITY: HOUSING INSECURITY: DO YOU FEEL UNSAFE GOING BACK TO THE PLACE WHERE YOU ARE LIVING?: NO

## 2024-09-21 SDOH — SOCIAL STABILITY: SOCIAL INSECURITY: ABUSE SCREEN: ADULT

## 2024-09-21 SDOH — SOCIAL STABILITY: SOCIAL INSECURITY: ARE THERE ANY APPARENT SIGNS OF INJURIES/BEHAVIORS THAT COULD BE RELATED TO ABUSE/NEGLECT?: NO

## 2024-09-21 SDOH — SOCIAL STABILITY: SOCIAL INSECURITY: ARE YOU OR HAVE YOU BEEN THREATENED OR ABUSED PHYSICALLY, EMOTIONALLY, OR SEXUALLY BY ANYONE?: NO

## 2024-09-21 SDOH — SOCIAL STABILITY: SOCIAL INSECURITY: HAS ANYONE EVER THREATENED TO HURT YOUR FAMILY OR YOUR PETS?: NO

## 2024-09-21 SDOH — SOCIAL STABILITY: SOCIAL INSECURITY: DO YOU FEEL ANYONE HAS EXPLOITED OR TAKEN ADVANTAGE OF YOU FINANCIALLY OR OF YOUR PERSONAL PROPERTY?: NO

## 2024-09-21 SDOH — HEALTH STABILITY: MENTAL HEALTH: CURRENT SMOKER: 0

## 2024-09-21 SDOH — HEALTH STABILITY: MENTAL HEALTH: NON-SPECIFIC ACTIVE SUICIDAL THOUGHTS (PAST 1 MONTH): NO

## 2024-09-21 SDOH — SOCIAL STABILITY: SOCIAL INSECURITY: HAVE YOU HAD THOUGHTS OF HARMING ANYONE ELSE?: NO

## 2024-09-21 SDOH — SOCIAL STABILITY: SOCIAL INSECURITY: DOES ANYONE TRY TO KEEP YOU FROM HAVING/CONTACTING OTHER FRIENDS OR DOING THINGS OUTSIDE YOUR HOME?: NO

## 2024-09-21 SDOH — SOCIAL STABILITY: SOCIAL INSECURITY: HAVE YOU HAD ANY THOUGHTS OF HARMING ANYONE ELSE?: NO

## 2024-09-21 SDOH — HEALTH STABILITY: MENTAL HEALTH: SUICIDAL BEHAVIOR (LIFETIME): NO

## 2024-09-21 ASSESSMENT — PATIENT HEALTH QUESTIONNAIRE - PHQ9
2. FEELING DOWN, DEPRESSED OR HOPELESS: NOT AT ALL
SUM OF ALL RESPONSES TO PHQ9 QUESTIONS 1 & 2: 0
1. LITTLE INTEREST OR PLEASURE IN DOING THINGS: NOT AT ALL

## 2024-09-21 ASSESSMENT — PAIN SCALES - GENERAL
PAINLEVEL_OUTOF10: 6
PAINLEVEL_OUTOF10: 7
PAINLEVEL_OUTOF10: 6
PAINLEVEL_OUTOF10: 7
PAINLEVEL_OUTOF10: 6
PAINLEVEL_OUTOF10: 6
PAINLEVEL_OUTOF10: 5 - MODERATE PAIN

## 2024-09-21 ASSESSMENT — LIFESTYLE VARIABLES
HOW OFTEN DO YOU HAVE 6 OR MORE DRINKS ON ONE OCCASION: NEVER
SKIP TO QUESTIONS 9-10: 1
AUDIT-C TOTAL SCORE: 0
HOW OFTEN DO YOU HAVE A DRINK CONTAINING ALCOHOL: NEVER
AUDIT-C TOTAL SCORE: 0
HOW MANY STANDARD DRINKS CONTAINING ALCOHOL DO YOU HAVE ON A TYPICAL DAY: PATIENT DOES NOT DRINK

## 2024-09-21 NOTE — SIGNIFICANT EVENT
"   24 1019   Risk Category: Admission   Prior  birth or prior uterine incision? No   Number of previous vaginal births? 0   Multiple Births N   Known bleeding disorder or coagulopathy? No   History of Postpartum Hemorrhage No history of postpartum hemorrhage   Induction of Labor (with oxytocin) or Cervical Ripening? No   Large uterine fibroids? No   Intra-amniotic infection? No   Known Fetal Demise No   Polyhydramnios? No   Suspected Pre-Eclampsia or HELLP Syndrome No   Active bleeding more than \"bloody show\"? No   Suspected placenta accreta or percreta? No   Placenta previa, low lying placenta? No       "

## 2024-09-21 NOTE — PROGRESS NOTES
MD to bedside for labor check    Patient continues to make progress.  Previous exam was 5/80/-2.  Now 5/90/-1.  Patient declines AROM and Pit augmentation    Fetus remains category 1 contractions are irregular but felt.  Will continue expectant management of labor.

## 2024-09-21 NOTE — H&P
OB Admission H&P       Maria T Negrete is a 39 y.o.  at 39w2d. ESTHELA: 2024, by Last Menstrual Period. Estimated fetal weight: 358 8 g extrapolated from last ultrasound. She has had DrsRony Lomas and Julieth.    Chief Complaint: Contractions    Assessment/Plan    Patient presented for evaluation of strong painful contractions since 1 AM.  On exam she is 480-2 with a soft cervix and intact.  Fetus is category 1 and cephalic on ultrasound she is GBS negative will admit for expectant management of labor.  Of note she is A1 GDM so we will check sugars every 4 hours in latent labor and every 2 hours in active labor.    Options for delivery have been discussed with the patient and she elects a vaginal delivery.  Labor has been discussed in detail. The risks, benefits, complications, alternatives, expected outcomes, potential problems during recuperation and recovery, and the risks of not performing the procedure were discussed with the patient. The patient stated understanding that the risks of delivery include, but are not limited to: death; reaction to medications; injury to bowel, bladder, ureters, uterus, cervix, vagina, and other pelvic and abdominal structures, infection; blood loss and possible need for transfusion; and potential need for surgery, including hysterectomy. The risks of injury to the infant during delivery were also discussed. All questions were answered. There was concurrence with the planned procedure, and the patient wanted to proceed.    Admit to inpatient status. I anticipate that this patient will require a stay exceeding at least 2 midnights for delivery and postpartum care.  Active management of labor.  Management of pregnancy complications, as indicated.    Subjective   Good fetal movement. Denies vaginal bleeding., C/O bloody show., Denies leaking of fluid.  ,  Reports painful regular contractions        Pregnancy Problems (from 24 to present)       Problem Noted Resolved     Normal labor ([facility]) 2024 by Ryan Velasco MD No    Priority:  Medium      38 weeks gestation of pregnancy ([facility]) 2024 by BAR Loya-CNP No    Priority:  Medium      Overview Addendum 9/10/2024  8:56 AM by Edwin Lomas MD     - Primary OB- Dr. Clancy  -Counseled on RSV vaccine today (9/3) given close to point where she would not be able to receive this. Pt is considering. Will think about it. Aware that tomorrow () would be her last day eligible for vaccine.   -Desires to breastfeed   -Delivery planning- recommend 39 wk delivery. Pt asking for possibility of  delivery. Counseled on MOD and risk vs benefits of vaginal vs primary C/D (see note 2024)-- pt considering    Desired provider in labor: [] CNM  [] Physician  [x] Blood Products: [x] Yes, accepts [] No, needs counseling  [x] Initial BMI: 20.80   [x] Prenatal Labs: completed  [x] Cervical Cancer Screening up to date Needs PP PAP (last PAP 2018 ASCUS +HPV)  [x] Rh status:   A pos  [x] Genetic Screening:  did not complete cffDNA  [x] NT US: (11-13 wks) too late  [x] Baby ASA (if indicated): not offered  [x] Pregnancy dated by: LMP and ultrasound in [place] at 19 weeks    [x] Anatomy US: (19-20 wks): normal  [] Federal Sterilization consent signed (if indicated):  [x] 1hr GCT at 24-28wks: 189  3-hour GTT 90/189/160/88  [x] Rhogam (if indicated):  NA  [x] Fetal Surveillance (if indicated): weekly NSTs for AMA  [x] Tdap (27-32 wks, may be given up to 36 wks if initial window missed): given 2024  [x] RSV (32-36 wks) (Sept. to end ):  NA  [] Flu Vaccine:    [x] Breastfeeding: yes  [] Postpartum Birth control method:   [x] GBS at 36 - 37 wks: neg  [x] 39 weeks discussion of IOL vs. Expectant management: IOL scheduled for Chaitanya  at 0800  [] Mode of delivery ( anticipated ):          Advanced maternal age, primigravida, antepartum ([facility]) 2024 by Edwin Lomas MD No    Priority:  Medium      Overview  Addendum 9/3/2024  2:14 PM by BAR Loya-CNP     cffDNA at 34 week visit  Weekly  testing now until delivery given AMA          Diet controlled gestational diabetes mellitus (GDM), antepartum ([facility]) 2024 by Edwin Lomas MD No    Priority:  Medium      Overview Addendum 9/10/2024  2:29 PM by BAR Justice-CNS     -Shared Care with Dr. Clancy/Cesilia  - MFM Consult completed-   -MFM 36 wk visit  -Serial growth ultrasounds starting at 28 weeks    Last ultrasound: :  EFW 64%, AC 85%, BPP - EFW 46%, AC 58%--> will reschedule growth for ASAP (org. )    Fetal surveillance: if medication initiated   -Weekly at 32 weeks  -Twice weekly at 36 weeks    Current Regimen: nutrition    Delivery Plan: Recommend 39 wk delivery- MOD --> pt considering primary . See counseling   Intrapartum:  GDM protocol  Postpartum: No medication    Recommend PP 2hr gtt and q3yr F/U with PCP for A1C and TSH     24 BG log reviewed and more than 80% within goal range--> nutrition   2024 Nutrition plan    2024 Nutrition plan    9/3/2024 : nutrition  9/10/2024 Nutrition plan                     OB History    Para Term  AB Living   1 0 0 0 0 0   SAB IAB Ectopic Multiple Live Births   0 0 0 0 0      # Outcome Date GA Lbr Jermain/2nd Weight Sex Type Anes PTL Lv   1 Current                No past surgical history on file.    Social History     Tobacco Use    Smoking status: Never    Smokeless tobacco: Never   Substance Use Topics    Alcohol use: Not Currently       No Known Allergies    Medications Prior to Admission   Medication Sig Dispense Refill Last Dose    Autolet lancing device Test daily before all meals/snacks and once before bedtime. 1 each 0 Past Week    blood-glucose meter misc Test daily before all meals/snacks and once before bedtime. [phone] each 1 Past Week    ferrous sulfate 325 (65 Fe) MG EC tablet Take 65 mg by mouth every other day. Do not  "crush, chew, or split.   9/20/2024    prenatal vit,calc76-iron-folic (Prenatabs Rx) 29 mg iron- 1 mg tablet 1 tablet once daily.   9/20/2024     Objective     Last Vitals  Temp Pulse Resp BP MAP O2 Sat   36.9 °C (98.4 °F) 97 18 128/75 95 97 %         Physical Exam  General: NAD, mood appropriate  Cardiopulmonary: warm and well perfused, breathing comfortably on room air  Abdomen: Gravid, non-tender  Extremities: Symmetric  Speculum Exam: deferred  Cervix: 4 /80 /-2      Fetal Monitoring  Baseline: 145 bpm, Variability: Moderate, Accelerations: Absent and Decelerations: None  Uterine Activity: Irregular contractions  Interpretation: Category 1    Bedside Ultrasound: YES, Findingscephlaic with MVP 2.7cm    Labs in chart were reviewed.  No results found for: \"ABO\", \"LABRH\", \"ABSCRN\"  No results found for: \"WBC\", \"HGB\", \"HCT\", \"PLT\"  0   Lab Value Date/Time    GRPBSTREP No Group B Streptococcus (GBS) isolated 08/29/2024 1630     No results found for: \"GLUCOSE\", \"NA\", \"K\", \"CL\", \"CO2\", \"ANIONGAP\", \"BUN\", \"CREATININE\", \"EGFR\", \"CALCIUM\", \"ALBUMIN\", \"PROT\", \"ALKPHOS\", \"ALT\", \"AST\", \"BILITOT\"  No results found for: \"UTPCR\"          Prenatal labs reviewed, not remarkable           "

## 2024-09-22 VITALS
TEMPERATURE: 98.1 F | SYSTOLIC BLOOD PRESSURE: 126 MMHG | WEIGHT: 166.89 LBS | DIASTOLIC BLOOD PRESSURE: 78 MMHG | HEART RATE: 85 BPM | RESPIRATION RATE: 18 BRPM | OXYGEN SATURATION: 98 % | BODY MASS INDEX: 27.77 KG/M2

## 2024-09-22 LAB
GLUCOSE BLD MANUAL STRIP-MCNC: 92 MG/DL (ref 74–99)
GLUCOSE BLD MANUAL STRIP-MCNC: 92 MG/DL (ref 74–99)
GLUCOSE BLD MANUAL STRIP-MCNC: 96 MG/DL (ref 74–99)
TREPONEMA PALLIDUM IGG+IGM AB [PRESENCE] IN SERUM OR PLASMA BY IMMUNOASSAY: NONREACTIVE

## 2024-09-22 PROCEDURE — 10907ZC DRAINAGE OF AMNIOTIC FLUID, THERAPEUTIC FROM PRODUCTS OF CONCEPTION, VIA NATURAL OR ARTIFICIAL OPENING: ICD-10-PCS | Performed by: STUDENT IN AN ORGANIZED HEALTH CARE EDUCATION/TRAINING PROGRAM

## 2024-09-22 PROCEDURE — 2500000004 HC RX 250 GENERAL PHARMACY W/ HCPCS (ALT 636 FOR OP/ED): Performed by: NURSE ANESTHETIST, CERTIFIED REGISTERED

## 2024-09-22 PROCEDURE — 82947 ASSAY GLUCOSE BLOOD QUANT: CPT

## 2024-09-22 PROCEDURE — 1100000001 HC PRIVATE ROOM DAILY

## 2024-09-22 PROCEDURE — 51701 INSERT BLADDER CATHETER: CPT

## 2024-09-22 PROCEDURE — 2500000004 HC RX 250 GENERAL PHARMACY W/ HCPCS (ALT 636 FOR OP/ED): Performed by: ADVANCED PRACTICE MIDWIFE

## 2024-09-22 PROCEDURE — 2500000001 HC RX 250 WO HCPCS SELF ADMINISTERED DRUGS (ALT 637 FOR MEDICARE OP): Performed by: STUDENT IN AN ORGANIZED HEALTH CARE EDUCATION/TRAINING PROGRAM

## 2024-09-22 PROCEDURE — 0HQ9XZZ REPAIR PERINEUM SKIN, EXTERNAL APPROACH: ICD-10-PCS | Performed by: STUDENT IN AN ORGANIZED HEALTH CARE EDUCATION/TRAINING PROGRAM

## 2024-09-22 PROCEDURE — 2500000004 HC RX 250 GENERAL PHARMACY W/ HCPCS (ALT 636 FOR OP/ED): Performed by: STUDENT IN AN ORGANIZED HEALTH CARE EDUCATION/TRAINING PROGRAM

## 2024-09-22 PROCEDURE — 59050 FETAL MONITOR W/REPORT: CPT

## 2024-09-22 PROCEDURE — 59409 OBSTETRICAL CARE: CPT | Performed by: STUDENT IN AN ORGANIZED HEALTH CARE EDUCATION/TRAINING PROGRAM

## 2024-09-22 PROCEDURE — 7100000016 HC LABOR RECOVERY PER HOUR

## 2024-09-22 PROCEDURE — 7210000002 HC LABOR PER HOUR

## 2024-09-22 RX ORDER — IBUPROFEN 600 MG/1
600 TABLET ORAL EVERY 6 HOURS
Status: DISCONTINUED | OUTPATIENT
Start: 2024-09-22 | End: 2024-09-24 | Stop reason: HOSPADM

## 2024-09-22 RX ORDER — FENTANYL CITRATE 50 UG/ML
INJECTION, SOLUTION INTRAMUSCULAR; INTRAVENOUS AS NEEDED
Status: DISCONTINUED | OUTPATIENT
Start: 2024-09-22 | End: 2024-09-22

## 2024-09-22 RX ORDER — ENOXAPARIN SODIUM 100 MG/ML
40 INJECTION SUBCUTANEOUS EVERY 24 HOURS
Status: DISCONTINUED | OUTPATIENT
Start: 2024-09-23 | End: 2024-09-24 | Stop reason: HOSPADM

## 2024-09-22 RX ORDER — LOPERAMIDE HYDROCHLORIDE 2 MG/1
4 CAPSULE ORAL EVERY 2 HOUR PRN
Status: DISCONTINUED | OUTPATIENT
Start: 2024-09-22 | End: 2024-09-24 | Stop reason: HOSPADM

## 2024-09-22 RX ORDER — OXYTOCIN/0.9 % SODIUM CHLORIDE 30/500 ML
2-30 PLASTIC BAG, INJECTION (ML) INTRAVENOUS CONTINUOUS
Status: DISCONTINUED | OUTPATIENT
Start: 2024-09-22 | End: 2024-09-22

## 2024-09-22 RX ORDER — OXYTOCIN 10 [USP'U]/ML
10 INJECTION, SOLUTION INTRAMUSCULAR; INTRAVENOUS ONCE AS NEEDED
Status: DISCONTINUED | OUTPATIENT
Start: 2024-09-22 | End: 2024-09-24 | Stop reason: HOSPADM

## 2024-09-22 RX ORDER — BISACODYL 10 MG/1
10 SUPPOSITORY RECTAL DAILY PRN
Status: DISCONTINUED | OUTPATIENT
Start: 2024-09-22 | End: 2024-09-24 | Stop reason: HOSPADM

## 2024-09-22 RX ORDER — ONDANSETRON 4 MG/1
4 TABLET, FILM COATED ORAL EVERY 6 HOURS PRN
Status: DISCONTINUED | OUTPATIENT
Start: 2024-09-22 | End: 2024-09-24 | Stop reason: HOSPADM

## 2024-09-22 RX ORDER — OXYTOCIN/0.9 % SODIUM CHLORIDE 30/500 ML
60 PLASTIC BAG, INJECTION (ML) INTRAVENOUS ONCE AS NEEDED
Status: DISCONTINUED | OUTPATIENT
Start: 2024-09-22 | End: 2024-09-24 | Stop reason: HOSPADM

## 2024-09-22 RX ORDER — DIPHENHYDRAMINE HCL 25 MG
25 CAPSULE ORAL EVERY 6 HOURS PRN
Status: DISCONTINUED | OUTPATIENT
Start: 2024-09-22 | End: 2024-09-24 | Stop reason: HOSPADM

## 2024-09-22 RX ORDER — POLYETHYLENE GLYCOL 3350 17 G/17G
17 POWDER, FOR SOLUTION ORAL 2 TIMES DAILY PRN
Status: DISCONTINUED | OUTPATIENT
Start: 2024-09-22 | End: 2024-09-24 | Stop reason: HOSPADM

## 2024-09-22 RX ORDER — ADHESIVE BANDAGE
10 BANDAGE TOPICAL
Status: DISCONTINUED | OUTPATIENT
Start: 2024-09-22 | End: 2024-09-24 | Stop reason: HOSPADM

## 2024-09-22 RX ORDER — CARBOPROST TROMETHAMINE 250 UG/ML
250 INJECTION, SOLUTION INTRAMUSCULAR ONCE AS NEEDED
Status: DISCONTINUED | OUTPATIENT
Start: 2024-09-22 | End: 2024-09-24 | Stop reason: HOSPADM

## 2024-09-22 RX ORDER — DIPHENHYDRAMINE HYDROCHLORIDE 50 MG/ML
25 INJECTION INTRAMUSCULAR; INTRAVENOUS EVERY 6 HOURS PRN
Status: DISCONTINUED | OUTPATIENT
Start: 2024-09-22 | End: 2024-09-24 | Stop reason: HOSPADM

## 2024-09-22 RX ORDER — NIFEDIPINE 10 MG/1
10 CAPSULE ORAL ONCE AS NEEDED
Status: DISCONTINUED | OUTPATIENT
Start: 2024-09-22 | End: 2024-09-24 | Stop reason: HOSPADM

## 2024-09-22 RX ORDER — LIDOCAINE 560 MG/1
1 PATCH PERCUTANEOUS; TOPICAL; TRANSDERMAL
Status: DISCONTINUED | OUTPATIENT
Start: 2024-09-22 | End: 2024-09-24 | Stop reason: HOSPADM

## 2024-09-22 RX ORDER — ACETAMINOPHEN 325 MG/1
975 TABLET ORAL EVERY 6 HOURS
Status: DISCONTINUED | OUTPATIENT
Start: 2024-09-22 | End: 2024-09-24 | Stop reason: HOSPADM

## 2024-09-22 RX ORDER — LABETALOL HYDROCHLORIDE 5 MG/ML
20 INJECTION, SOLUTION INTRAVENOUS ONCE AS NEEDED
Status: DISCONTINUED | OUTPATIENT
Start: 2024-09-22 | End: 2024-09-24 | Stop reason: HOSPADM

## 2024-09-22 RX ORDER — MISOPROSTOL 200 UG/1
800 TABLET ORAL ONCE AS NEEDED
Status: DISCONTINUED | OUTPATIENT
Start: 2024-09-22 | End: 2024-09-24 | Stop reason: HOSPADM

## 2024-09-22 RX ORDER — SIMETHICONE 80 MG
80 TABLET,CHEWABLE ORAL 4 TIMES DAILY PRN
Status: DISCONTINUED | OUTPATIENT
Start: 2024-09-22 | End: 2024-09-24 | Stop reason: HOSPADM

## 2024-09-22 RX ORDER — TRANEXAMIC ACID 100 MG/ML
1000 INJECTION, SOLUTION INTRAVENOUS ONCE AS NEEDED
Status: DISCONTINUED | OUTPATIENT
Start: 2024-09-22 | End: 2024-09-24 | Stop reason: HOSPADM

## 2024-09-22 RX ORDER — METHYLERGONOVINE MALEATE 0.2 MG/ML
0.2 INJECTION INTRAVENOUS ONCE AS NEEDED
Status: DISCONTINUED | OUTPATIENT
Start: 2024-09-22 | End: 2024-09-24 | Stop reason: HOSPADM

## 2024-09-22 RX ORDER — ONDANSETRON HYDROCHLORIDE 2 MG/ML
4 INJECTION, SOLUTION INTRAVENOUS EVERY 6 HOURS PRN
Status: DISCONTINUED | OUTPATIENT
Start: 2024-09-22 | End: 2024-09-24 | Stop reason: HOSPADM

## 2024-09-22 RX ORDER — HYDRALAZINE HYDROCHLORIDE 20 MG/ML
5 INJECTION INTRAMUSCULAR; INTRAVENOUS ONCE AS NEEDED
Status: DISCONTINUED | OUTPATIENT
Start: 2024-09-22 | End: 2024-09-24 | Stop reason: HOSPADM

## 2024-09-22 RX ADMIN — IBUPROFEN 600 MG: 600 TABLET ORAL at 16:55

## 2024-09-22 RX ADMIN — SODIUM CHLORIDE, POTASSIUM CHLORIDE, SODIUM LACTATE AND CALCIUM CHLORIDE 125 ML/HR: 600; 310; 30; 20 INJECTION, SOLUTION INTRAVENOUS at 08:31

## 2024-09-22 RX ADMIN — IBUPROFEN 600 MG: 600 TABLET ORAL at 23:00

## 2024-09-22 RX ADMIN — Medication 2 MILLI-UNITS/MIN: at 13:18

## 2024-09-22 RX ADMIN — ACETAMINOPHEN 975 MG: 325 TABLET ORAL at 16:53

## 2024-09-22 RX ADMIN — SODIUM CHLORIDE, POTASSIUM CHLORIDE, SODIUM LACTATE AND CALCIUM CHLORIDE 125 ML/HR: 600; 310; 30; 20 INJECTION, SOLUTION INTRAVENOUS at 13:16

## 2024-09-22 RX ADMIN — FENTANYL CITRATE 50 MCG: 50 INJECTION, SOLUTION INTRAMUSCULAR; INTRAVENOUS at 14:38

## 2024-09-22 RX ADMIN — Medication 60 MILLI-UNITS/MIN: at 14:34

## 2024-09-22 RX ADMIN — Medication 5 ML: at 14:33

## 2024-09-22 RX ADMIN — SODIUM CHLORIDE, POTASSIUM CHLORIDE, SODIUM LACTATE AND CALCIUM CHLORIDE 125 ML/HR: 600; 310; 30; 20 INJECTION, SOLUTION INTRAVENOUS at 00:40

## 2024-09-22 RX ADMIN — ACETAMINOPHEN 975 MG: 325 TABLET ORAL at 23:00

## 2024-09-22 ASSESSMENT — PAIN SCALES - GENERAL
PAINLEVEL_OUTOF10: 1
PAIN_LEVEL: 0
PAINLEVEL_OUTOF10: 0 - NO PAIN

## 2024-09-22 NOTE — ANESTHESIA PROCEDURE NOTES
Epidural Block    Patient location during procedure: OB  Start time: 9/21/2024 8:54 PM  End time: 9/21/2024 8:58 PM  Reason for block: labor analgesia  Staffing  Performed: CRNA   Authorized by: FAB Stringer    Performed by: FAB Stringer    Preanesthetic Checklist  Completed: patient identified, IV checked, risks and benefits discussed, surgical consent, pre-op evaluation, timeout performed and sterile techniques followed  Block Timeout  RN/Licensed healthcare professional reads aloud to the Anesthesia provider and entire team: Patient identity, procedure with side and site, patient position, and as applicable the availability of implants/special equipment/special requirements.  Patient on coagulant treatment: no  Timeout performed at: 9/21/2024 8:54 PM  Block Placement  Patient position: sitting  Prep: ChloraPrep  Sterility prep: mask, hand, gloves, drape and cap  Sedation level: no sedation  Patient monitoring: heart rate, continuous pulse oximetry and blood pressure  Approach: midline  Local numbing: lidocaine 1% to skin and subcutaneous tissues  Vertebral space: lumbar  Lumbar location: L2-L3  Epidural  Loss of resistance technique: saline  Guidance: landmark technique        Needle  Needle type: Tuohy   Needle gauge: 17  Needle length: 9 cm  Needle insertion depth: 7 cm  Catheter size: 19 G  Catheter at skin depth: 14 cm  Catheter securement method: clear occlusive dressing    Test dose: lidocaine 1.5% with epinephrine 1-to-200,000  Test dose: lidocaine 1.5% with epinephrine 1-to-200,000  Test dose result: no positive test dose    PCEA  Medication concentration used: 0.2% Ropivacaine with 2 mcg/mL Fentanyl  Dose (mL): 5  Lockout (minutes): 30  1-Hour Limit (boluses/hr): 2  Basal Rate: 12        Assessment  Sensory level: T10 bilateral  Number of attempts: 2  Events: no positive test dose  Procedure assessment: patient tolerated procedure well with no immediate  complications  Additional Notes  First attempt at L3-4, positive heme. Second attempt successful at L2-3.

## 2024-09-22 NOTE — PROGRESS NOTES
Comfortable with epidural  CTX irregular q 4-8 minutes  Category 1 tracing  Pt has declined AROM, pitocin augmentation, and cervical exams all night  Management per day team

## 2024-09-22 NOTE — ANESTHESIA POSTPROCEDURE EVALUATION
Patient: Maria T Negrete    Procedure Summary       Date: 09/21/24 Room / Location:     Anesthesia Start: 2054 Anesthesia Stop: 09/22/24 1423    Procedure: Labor Analgesia Diagnosis:     Scheduled Providers:  Responsible Provider: FAB Franz    Anesthesia Type: epidural ASA Status: 2            Anesthesia Type: epidural    /58   Pulse (!) 115   Temp 36.6 °C (97.9 °F)   Resp 18   Wt 75.7 kg (166 lb 14.2 oz)   LMP 12/21/2023   SpO2 98%   Breastfeeding Yes   BMI 27.77 kg/m²      Anesthesia Post Evaluation    Patient location during evaluation: bedside  Patient participation: complete - patient participated  Level of consciousness: awake  Pain score: 0  Pain management: adequate  Airway patency: patent  Cardiovascular status: acceptable and stable  Respiratory status: acceptable  Hydration status: balanced  Postoperative Nausea and Vomiting: none        There were no known notable events for this encounter.

## 2024-09-22 NOTE — PROGRESS NOTES
AM labor check.    Comfortable on epidural.    SVE 6.5/80/-1, cervix primarily on the right.    /mod/+A/no decels.    UCs irregular    Offered AROM vs Pit augmentation given dysfunctional contraction pattern.. Pt elects for AROM. Thick mec noted.    Will CTM

## 2024-09-22 NOTE — ANESTHESIA PREPROCEDURE EVALUATION
Is the patient currently in the state of [place]? YES    Visit mode:VIDEO    If the visit is dropped, the patient can be reconnected by: VIDEO VISIT: Text to cell phone: [phone]    Will anyone else be joining the visit? NO      How would you like to obtain your AVS? MyChart    Are changes needed to the allergy or medication list? YES: pt states due to his Adderall dosage increase, he does not have enough pills to last until his next refill. Pt states he also needs a refill of Vyvanse.      Reason for visit: RECHECK              Patient: Maria T Negrete    Evaluation Method: In-person visit    Procedure Information    Date: 09/21/24  Procedure: Labor Analgesia         Relevant Problems   Endocrine   (+) Diet controlled gestational diabetes mellitus (GDM), antepartum ([facility])      GYN   (+) 38 weeks gestation of pregnancy ([facility])       Clinical information reviewed:   Tobacco  Allergies  Meds   Med Hx  Surg Hx   Fam Hx  Soc Hx        NPO Detail:  No data recorded     OB/GYN     Physical Exam    Airway  Mallampati: II     Cardiovascular - normal exam     Dental - normal exam     Pulmonary - normal exam     Abdominal - normal exam             Anesthesia Plan    History of general anesthesia?: yes  History of complications of general anesthesia?: no    ASA 2     epidural     The patient is not a current smoker.    Anesthetic plan and risks discussed with patient and spouse.

## 2024-09-22 NOTE — L&D DELIVERY NOTE
OB Delivery Note  2024  Maria T Negrete  39 y.o.   Vaginal, Spontaneous       Gestational Age: 39w3d  /Para:   Quantitative Blood Loss: Admission to Discharge: 0 mL (2024  8:31 AM - 2024  3:36 PM)    Kwadwo Negrete [[id number]]      Labor Events    Rupture date/time: 2024 0739  Rupture type: Artificial  Fluid color: Meconium  Fluid odor: None  Labor type: Spontaneous Onset of Labor  Labor allowed to proceed with plans for an attempted vaginal birth?: Yes  Augmentation: Oxytocin, AROM  Augmentation date/time: 2024 1317  Augmentation indications: Ineffective Contraction Pattern  Complications: None       Labor Event Times    Labor onset date/time: 2024 0857  Dilation complete date/time: 2024 1048  Start pushing date/time: 2024 1050       Placenta    Placenta delivery date/time: 2024 1427  Placenta removal: Spontaneous  Placenta appearance: Intact  Placenta disposition: discarded       Cord    Vessels: 3 vessels  Complications: None  Delayed cord clamping?: Yes  Cord blood disposition: Discarded  Gases sent?: No       Lacerations    Episiotomy: None  Perineal laceration: 1st  Perineal laceration repaired?: Yes  Vaginal laceration?: Yes  Vaginal laceration location: right  Vaginal laceration repaired?: Yes  Repair suture: 2-0 Synthetic Suture, 3-0 Synthetic Suture       Anesthesia    Method: Epidural       Operative Delivery    Forceps attempted?: No  Vacuum extractor attempted?: No       Shoulder Dystocia    Shoulder dystocia present?: No       [place] Delivery    Time head delivered: 2024 14:23:00  Birth date/time: 2024 14:23:00  Delivery type: Vaginal, Spontaneous  Complications: None       Resuscitation    Method: Suctioning, Tactile stimulation       Apgars    Living status: Living  Apgar Component Scores:  1 min.:  5 min.:  10 min.:  15 min.:  20 min.:    Skin color:  0  1       Heart rate:  2  2       Reflex irritability:  1  2       Muscle tone:   1  1       Respiratory effort:  2  2       Total:  6  8       Apgars assigned by: CHERELLE ORELLANA MD       Delivery Providers    Delivering clinician: Ryan Velasco MD   Provider Role    Mei Siddiqui, SILVIA Delivery Nurse    Mehnaz Iyer RN Nursery Nurse     Resident                 Intrauterine Device Inserted : No, none requested    Uncomplicated vaginal delivery of male fetus in cephalic position.  Cord was clamped and cut after 60 seconds.  Placenta delivered spontaneously with gentle cord traction.  Brisk bleeding was noted from the vagina, though good uterine tone was noted.  Vagina was packed with two sponges and the vagina was examined showing a first-degree perineal laceration and a right vaginal laceration with brisk bleeding.  The vaginal portion of the first-degree laceration was repaired with 2-0 Vicryl in a running locked fashion the skin portion of the first-degree laceration was repaired with a 3-0 Vicryl crown stitch.  The vaginal laceration was addressed with a running locked stitch of 2-0 Vicryl.  Vaginal sponges were then removed and the lower uterine segment was examined and a small amount of clot was removed.  Good hemostasis was observed.  Anesthesia for the repair was achieved with 50 mcg of IV fentanyl and 7 cc of 1% lidocaine plain.  The patient was then returned to the supine position and allowed to recover with her infant on labor and delivery.    Ryan Velasco MD

## 2024-09-23 ENCOUNTER — PHARMACY VISIT (OUTPATIENT)
Dept: PHARMACY | Facility: CLINIC | Age: 39
End: 2024-09-23
Payer: COMMERCIAL

## 2024-09-23 PROBLEM — Z3A.38 38 WEEKS GESTATION OF PREGNANCY (HHS-HCC): Status: RESOLVED | Noted: 2024-08-16 | Resolved: 2024-09-23

## 2024-09-23 PROBLEM — Z37.9 NORMAL LABOR (HHS-HCC): Status: RESOLVED | Noted: 2024-09-21 | Resolved: 2024-09-23

## 2024-09-23 LAB
ERYTHROCYTE [DISTWIDTH] IN BLOOD BY AUTOMATED COUNT: 13.5 % (ref 11.5–14.5)
HCT VFR BLD AUTO: 24.1 % (ref 36–46)
HGB BLD-MCNC: 8.1 G/DL (ref 12–16)
MCH RBC QN AUTO: 31.2 PG (ref 26–34)
MCHC RBC AUTO-ENTMCNC: 33.6 G/DL (ref 32–36)
MCV RBC AUTO: 93 FL (ref 80–100)
NRBC BLD-RTO: 0 /100 WBCS (ref 0–0)
PLATELET # BLD AUTO: 182 X10*3/UL (ref 150–450)
RBC # BLD AUTO: 2.6 X10*6/UL (ref 4–5.2)
WBC # BLD AUTO: 15.1 X10*3/UL (ref 4.4–11.3)

## 2024-09-23 PROCEDURE — 1100000001 HC PRIVATE ROOM DAILY

## 2024-09-23 PROCEDURE — 36415 COLL VENOUS BLD VENIPUNCTURE: CPT | Performed by: STUDENT IN AN ORGANIZED HEALTH CARE EDUCATION/TRAINING PROGRAM

## 2024-09-23 PROCEDURE — 85027 COMPLETE CBC AUTOMATED: CPT | Performed by: STUDENT IN AN ORGANIZED HEALTH CARE EDUCATION/TRAINING PROGRAM

## 2024-09-23 PROCEDURE — 87491 CHLMYD TRACH DNA AMP PROBE: CPT | Mod: AHULAB | Performed by: MIDWIFE

## 2024-09-23 PROCEDURE — RXMED WILLOW AMBULATORY MEDICATION CHARGE

## 2024-09-23 PROCEDURE — 2500000004 HC RX 250 GENERAL PHARMACY W/ HCPCS (ALT 636 FOR OP/ED): Performed by: STUDENT IN AN ORGANIZED HEALTH CARE EDUCATION/TRAINING PROGRAM

## 2024-09-23 PROCEDURE — 2500000001 HC RX 250 WO HCPCS SELF ADMINISTERED DRUGS (ALT 637 FOR MEDICARE OP): Performed by: STUDENT IN AN ORGANIZED HEALTH CARE EDUCATION/TRAINING PROGRAM

## 2024-09-23 RX ORDER — IBUPROFEN 600 MG/1
600 TABLET ORAL EVERY 6 HOURS
Qty: 56 TABLET | Refills: 0 | Status: SHIPPED | OUTPATIENT
Start: 2024-09-23 | End: 2024-10-07

## 2024-09-23 RX ORDER — AMOXICILLIN 250 MG
1 CAPSULE ORAL DAILY
Qty: 14 TABLET | Refills: 0 | Status: SHIPPED | OUTPATIENT
Start: 2024-09-23 | End: 2024-10-07

## 2024-09-23 RX ORDER — ACETAMINOPHEN 325 MG/1
975 TABLET ORAL EVERY 6 HOURS
Qty: 168 TABLET | Refills: 0 | Status: SHIPPED | OUTPATIENT
Start: 2024-09-23 | End: 2024-10-07

## 2024-09-23 RX ADMIN — IBUPROFEN 600 MG: 600 TABLET ORAL at 11:07

## 2024-09-23 RX ADMIN — ACETAMINOPHEN 975 MG: 325 TABLET ORAL at 18:50

## 2024-09-23 RX ADMIN — ACETAMINOPHEN 975 MG: 325 TABLET ORAL at 05:13

## 2024-09-23 RX ADMIN — ACETAMINOPHEN 975 MG: 325 TABLET ORAL at 11:07

## 2024-09-23 RX ADMIN — IBUPROFEN 600 MG: 600 TABLET ORAL at 05:13

## 2024-09-23 RX ADMIN — IBUPROFEN 600 MG: 600 TABLET ORAL at 18:49

## 2024-09-23 RX ADMIN — ENOXAPARIN SODIUM 40 MG: 40 INJECTION SUBCUTANEOUS at 06:45

## 2024-09-23 ASSESSMENT — PAIN SCALES - GENERAL
PAINLEVEL_OUTOF10: 2
PAINLEVEL_OUTOF10: 0 - NO PAIN
PAINLEVEL_OUTOF10: 0 - NO PAIN
PAINLEVEL_OUTOF10: 1
PAINLEVEL_OUTOF10: 1
PAINLEVEL_OUTOF10: 0 - NO PAIN
PAINLEVEL_OUTOF10: 1

## 2024-09-23 ASSESSMENT — PAIN DESCRIPTION - DESCRIPTORS: DESCRIPTORS: SORE

## 2024-09-23 NOTE — LACTATION NOTE
Lactation Consultant Note  Lactation Consultation  Reason for Consult: Initial assessment  Consultant Name: Andria RUBIN    Maternal Information  Has mother  before?: No  Infant to breast within first 2 hours of birth?: Yes  Exclusive Pump and Bottle Feed: No    Maternal Assessment  Breast Assessment:  (deferred)    Infant Assessment  Infant Behavior: Deep sleep    Feeding Assessment  Nutrition Source: Breastmilk  Feeding Method: Nursing at the breast    LATCH TOOL       Breast Pump       Other OB Lactation Tools       Patient Follow-up  Inpatient Lactation Follow-up Needed : Yes    Other OB Lactation Documentation  Maternal Risk Factors: Age over 30, primiparity    Recommendations/Summary  Mom reports that baby is latching well. Mom was asked to call for feed observation the next time she feeds.Reviewed milk supply patterns and  feeding patterns in the fist and second 24 HOL. Mom was asked to attempt/feed baby at least every 2-3 hours or on demand with a goal of 8-12 feeds daily. Feeding cues reviewed.Breastfeeding education reviewed and questions answered. Mom aware of lactation support and asked to call out for feed assistance and with questions as needed.

## 2024-09-23 NOTE — PROGRESS NOTES
Postpartum Progress Note    Assessment/Plan   Maria T Negrete is a 39 y.o., , who delivered at 39w3d gestation and is now postpartum day 1.    - c/b third stage PPH  -PPD1 H&H 24.1/8.1, down from admission H&H 39.0/13.1    Assessment & Plan  Normal labor ([facility])    Advanced maternal age, primigravida, antepartum ([facility])    Diet controlled gestational diabetes mellitus (GDM), antepartum ([facility])    38 weeks gestation of pregnancy ([facility])    Third-stage postpartum hemorrhage ([facility])    Pregnancy Problems (from 24 to present)       Problem Noted Resolved    Third-stage postpartum hemorrhage ([facility]) 2024 by Ryan Velasco MD No    Priority:  Medium      Normal labor ([facility]) 2024 by Ryan Velasco MD No    Priority:  Medium      38 weeks gestation of pregnancy ([facility]) 2024 by Aneta Jackson APRN-CNP No    Priority:  Medium      Overview Addendum 9/10/2024  8:56 AM by Edwin Lomas MD     - Primary OB- Dr. Clancy  -Counseled on RSV vaccine today (9/3) given close to point where she would not be able to receive this. Pt is considering. Will think about it. Aware that tomorrow () would be her last day eligible for vaccine.   -Desires to breastfeed   -Delivery planning- recommend 39 wk delivery. Pt asking for possibility of  delivery. Counseled on MOD and risk vs benefits of vaginal vs primary C/D (see note 2024)-- pt considering    Desired provider in labor: [] CNM  [] Physician  [x] Blood Products: [x] Yes, accepts [] No, needs counseling  [x] Initial BMI: 20.80   [x] Prenatal Labs: completed  [x] Cervical Cancer Screening up to date Needs PP PAP (last PAP  ASCUS +HPV)  [x] Rh status:   A pos  [x] Genetic Screening:  did not complete cffDNA  [x] NT US: (11-13 wks) too late  [x] Baby ASA (if indicated): not offered  [x] Pregnancy dated by: LMP and ultrasound in [place] at 19 weeks    [x] Anatomy US: (19-20 wks): normal  [] Federal Sterilization consent  signed (if indicated):  [x] 1hr GCT at 24-28wks: 189  3-hour GTT 90/189/160/88  [x] Rhogam (if indicated):  NA  [x] Fetal Surveillance (if indicated): weekly NSTs for AMA  [x] Tdap (27-32 wks, may be given up to 36 wks if initial window missed): given 2024  [x] RSV (32-36 wks) (Sept. to end ):  NA  [] Flu Vaccine:    [x] Breastfeeding: yes  [] Postpartum Birth control method:   [x] GBS at 36 - 37 wks: neg  [x] 39 weeks discussion of IOL vs. Expectant management: IOL scheduled for [facility]  at 0800  [] Mode of delivery ( anticipated ):          Advanced maternal age, primigravida, antepartum ([facility]) 2024 by Edwin Lomas MD No    Priority:  Medium      Overview Addendum 9/3/2024  2:14 PM by BAR Loya-CNP     cffDNA at 34 week visit  Weekly  testing now until delivery given AMA          Diet controlled gestational diabetes mellitus (GDM), antepartum ([facility]) 2024 by Edwin Lomas MD No    Priority:  Medium      Overview Addendum 9/10/2024  2:29 PM by BAR Justice-CNS     -Shared Care with Dr. Clancy/Cesilia  - [facility] Consult completed-   -MFM 36 wk visit  -Serial growth ultrasounds starting at 28 weeks    Last ultrasound: :  EFW 64%, AC 85%, BPP - EFW 46%, AC 58%--> will reschedule growth for ASAP (org. )    Fetal surveillance: if medication initiated   -Weekly at 32 weeks  -Twice weekly at 36 weeks    Current Regimen: nutrition    Delivery Plan: Recommend 39 wk delivery- MOD --> pt considering primary . See counseling   Intrapartum:  GDM protocol  Postpartum: No medication    Recommend PP 2hr gtt and q3yr F/U with PCP for A1C and TSH     24 BG log reviewed and more than 80% within goal range--> nutrition   2024 Nutrition plan    2024 Nutrition plan    9/3/2024 : nutrition  9/10/2024 Nutrition plan                   Hospital course:   c/b third stage postpartum hemorrhage  Currently breastfeeding; maternal blood  type APOS     Subjective   Her pain is well controlled with current medications  She is passing flatus but has not had BM since delivery  She is ambulating well  She is tolerating a Adult diet Regular  She reports no breast or nursing problems  She denies emotional concerns today   Her plan for contraception is none     Pt is doing and feeling well, has no questions or complaints. She is comfortable with anticipated discharge tomorrow. We discussed missing GC/CT results during pregnancy and pediatrician request to re-order, she is agreeable to this. We also discussed hemoglobin drop and potential implications, she denies dizziness or other concerns and has not showered. We reviewed safety measures and reporting symptoms to consider IV treatment PRN (IV was removed earlier this morning). We reviewed normal vs abnormal recovery, office follow up, reportable s/s, and available resources. She verbalized understanding to all information today.     Objective   Allergies:   Patient has no known allergies.         Last Vitals:  Temp Pulse Resp BP MAP Pulse Ox   36.4 °C (97.5 °F) 72 18 116/69 76 98 %     Vitals Min/Max Last 24 Hours:  Temp  Min: 36.1 °C (97 °F)  Max: 37.3 °C (99.1 °F)  Pulse  Min: 72  Max: 147  Resp  Min: 16  Max: 20  BP  Min: 104/55  Max: 148/56  MAP (mmHg)  Min: 72  Max: 91    Intake/Output:     Intake/Output Summary (Last 24 hours) at 9/23/2024 0819  Last data filed at 9/22/2024 1739  Gross per 24 hour   Intake 3076.65 ml   Output 1649 ml   Net 1427.65 ml       Physical Exam:  General: Examination reveals a well developed, well nourished, female, in no acute distress. She is alert and cooperative.  HEENT: PERRLA. External ears normal. Nose normal, no erythema or discharge. Mouth and throat clear.  Neck: supple, no significant adenopathy.  Lungs: clear to auscultation bilaterally.  Cardiac: regular rate and rhythm, S1, S2 normal, no murmur, click, rub or gallop.  Breasts: lactating, no erythema or  tenderness, nipples normal.  Abdomen: soft, non-tender, non-distended, no palpable masses, BS+.  Fundus: firm and 1FW below umblicus.  Perineum: deferred and well healing per pt, declined exam.  Extremities: no redness or tenderness in the calves or thighs, edema trace pedal edema+.  Neurological: alert, oriented, normal speech, no focal findings or movement disorder noted.  Psychological: awake and alert; oriented to person, place, and time.    Lab Data:  Labs in chart were reviewed.

## 2024-09-24 VITALS
TEMPERATURE: 97.2 F | BODY MASS INDEX: 27.77 KG/M2 | SYSTOLIC BLOOD PRESSURE: 107 MMHG | WEIGHT: 166.89 LBS | RESPIRATION RATE: 18 BRPM | OXYGEN SATURATION: 98 % | HEART RATE: 86 BPM | DIASTOLIC BLOOD PRESSURE: 66 MMHG

## 2024-09-24 LAB
C TRACH RRNA SPEC QL NAA+PROBE: NEGATIVE
N GONORRHOEA DNA SPEC QL PROBE+SIG AMP: NEGATIVE

## 2024-09-24 PROCEDURE — 90471 IMMUNIZATION ADMIN: CPT | Performed by: STUDENT IN AN ORGANIZED HEALTH CARE EDUCATION/TRAINING PROGRAM

## 2024-09-24 PROCEDURE — 90707 MMR VACCINE SC: CPT | Performed by: STUDENT IN AN ORGANIZED HEALTH CARE EDUCATION/TRAINING PROGRAM

## 2024-09-24 PROCEDURE — 2500000001 HC RX 250 WO HCPCS SELF ADMINISTERED DRUGS (ALT 637 FOR MEDICARE OP): Performed by: STUDENT IN AN ORGANIZED HEALTH CARE EDUCATION/TRAINING PROGRAM

## 2024-09-24 PROCEDURE — 36415 COLL VENOUS BLD VENIPUNCTURE: CPT | Performed by: STUDENT IN AN ORGANIZED HEALTH CARE EDUCATION/TRAINING PROGRAM

## 2024-09-24 PROCEDURE — 2500000004 HC RX 250 GENERAL PHARMACY W/ HCPCS (ALT 636 FOR OP/ED): Performed by: STUDENT IN AN ORGANIZED HEALTH CARE EDUCATION/TRAINING PROGRAM

## 2024-09-24 RX ADMIN — ACETAMINOPHEN 975 MG: 325 TABLET ORAL at 00:57

## 2024-09-24 RX ADMIN — MEASLES, MUMPS, AND RUBELLA VIRUS VACCINE LIVE 0.5 ML: 1000; 12500; 1000 INJECTION, POWDER, LYOPHILIZED, FOR SUSPENSION SUBCUTANEOUS at 10:34

## 2024-09-24 RX ADMIN — IBUPROFEN 600 MG: 600 TABLET ORAL at 06:43

## 2024-09-24 RX ADMIN — ACETAMINOPHEN 975 MG: 325 TABLET ORAL at 06:43

## 2024-09-24 RX ADMIN — IBUPROFEN 600 MG: 600 TABLET ORAL at 00:57

## 2024-09-24 RX ADMIN — ENOXAPARIN SODIUM 40 MG: 40 INJECTION SUBCUTANEOUS at 06:43

## 2024-09-24 ASSESSMENT — PAIN DESCRIPTION - DESCRIPTORS
DESCRIPTORS: SORE
DESCRIPTORS: SORE

## 2024-09-24 ASSESSMENT — PAIN DESCRIPTION - LOCATION: LOCATION: PERINEUM

## 2024-09-24 ASSESSMENT — PAIN SCALES - GENERAL
PAINLEVEL_OUTOF10: 1
PAINLEVEL_OUTOF10: 0 - NO PAIN
PAINLEVEL_OUTOF10: 3

## 2024-09-24 NOTE — PROGRESS NOTES
Postpartum Progress Note    Assessment/Plan   Maria T Negrete is a 39 y.o., , who delivered at 39w3d gestation and is now PPD#2 s/p   Doing well, plan for discharge home today.  Routine postpartum instructions.  Rx motrin/tylenol.  Encouraged ambulation.    Anemia due to acute blood loss  -Labs ordered for this am, but patient declines  -Asymptomatic  -Discussed IV vs oral iron, would like to proceed with oral  -Has iron supplements at home, declines rx    Episode of bradycardia overnight  -Resolved, asymptomatic    GDMA1  -FBG 92  -Plan for 2 hour OGT postpartum    Active Problems:    Advanced maternal age, primigravida, antepartum (HHS-HCC)    Diet controlled gestational diabetes mellitus (GDM), antepartum (HHS-HCC)    Third-stage postpartum hemorrhage (HHS-HCC)    Pregnancy Problems (from 24 to present)       Problem Noted Resolved    Third-stage postpartum hemorrhage (HHS-HCC) 2024 by Ryan Velasco MD No    Priority:  Medium      Advanced maternal age, primigravida, antepartum (HHS-HCC) 2024 by Edwin Lomas MD No    Priority:  Medium      Overview Addendum 9/3/2024  2:14 PM by BAR Loya-CNP     cffDNA at 34 week visit  Weekly  testing now until delivery given AMA          Diet controlled gestational diabetes mellitus (GDM), antepartum (HHS-HCC) 2024 by Edwin Lomas MD No    Priority:  Medium      Overview Addendum 9/10/2024  2:29 PM by BAR Justice-CNS     -Shared Care with Dr. Clancy/Cesilia  - MFM Consult completed-   -MFM 36 wk visit  -Serial growth ultrasounds starting at 28 weeks    Last ultrasound: :  EFW 64%, AC 85%, BPP - EFW 46%, AC 58%--> will reschedule growth for ASAP (org. )    Fetal surveillance: if medication initiated   -Weekly at 32 weeks  -Twice weekly at 36 weeks    Current Regimen: nutrition    Delivery Plan: Recommend 39 wk delivery- MOD --> pt considering primary . See counseling    Intrapartum:  GDM protocol  Postpartum: No medication    Recommend PP 2hr gtt and q3yr F/U with PCP for A1C and TSH     24 BG log reviewed and more than 80% within goal range--> nutrition   2024 Nutrition plan    2024 Nutrition plan    9/3/2024 : nutrition  9/10/2024 Nutrition plan             Normal labor ([facility]) 2024 by Ryan Velasco MD 2024 by BAR Lee-KENNETH    38 weeks gestation of pregnancy ([facility]) 2024 by BAR Loya-CNP 2024 by MYRTLE Lee    Overview Addendum 9/10/2024  8:56 AM by Edwin Lomas MD     - Primary OB- Dr. Clancy  -Counseled on RSV vaccine today (9/3) given close to point where she would not be able to receive this. Pt is considering. Will think about it. Aware that tomorrow () would be her last day eligible for vaccine.   -Desires to breastfeed   -Delivery planning- recommend 39 wk delivery. Pt asking for possibility of  delivery. Counseled on MOD and risk vs benefits of vaginal vs primary C/D (see note 2024)-- pt considering    Desired provider in labor: [] CNM  [] Physician  [x] Blood Products: [x] Yes, accepts [] No, needs counseling  [x] Initial BMI: 20.80   [x] Prenatal Labs: completed  [x] Cervical Cancer Screening up to date Needs PP PAP (last PAP 2018 ASCUS +HPV)  [x] Rh status:   A pos  [x] Genetic Screening:  did not complete cffDNA  [x] NT US: (11-13 wks) too late  [x] Baby ASA (if indicated): not offered  [x] Pregnancy dated by: LMP and ultrasound in [place] at 19 weeks    [x] Anatomy US: (19-20 wks): normal  [] Federal Sterilization consent signed (if indicated):  [x] 1hr GCT at 24-28wks: 189  3-hour GTT 90/189/160/88  [x] Rhogam (if indicated):  NA  [x] Fetal Surveillance (if indicated): weekly NSTs for AMA  [x] Tdap (27-32 wks, may be given up to 36 wks if initial window missed): given 2024  [x] RSV (32-36 wks) (Sept. to end ):  NA  [] Flu Vaccine:    [x] Breastfeeding:  yes  [] Postpartum Birth control method:   [x] GBS at 36 - 37 wks: neg  [x] 39 weeks discussion of IOL vs. Expectant management: IOL scheduled for Chaitanya 9/23 at 0800  [] Mode of delivery ( anticipated ):                    Subjective   Patient is doing well postpartum.  Pain is well controlled.  She is ambulating and voiding without difficulty.  No SOB, chest pain, lightheadedness.  VB is wnl.  Breastfeeding.       Objective   Allergies:   Patient has no known allergies.         Last Vitals:  Temp Pulse Resp BP MAP Pulse Ox   36.2 °C (97.2 °F) 86 18 107/66   98 %     Vitals Min/Max Last 24 Hours:  Temp  Min: 36.2 °C (97.2 °F)  Max: 37.5 °C (99.5 °F)  Pulse  Min: 43  Max: 101  Resp  Min: 16  Max: 18  BP  Min: 107/66  Max: 133/77    Intake/Output:   No intake or output data in the 24 hours ending 09/24/24 0818    Physical Exam:  Gen:  Alert, well-nourished, NAD  ABD:  Fundus firm, below umbilicus  EXT:  Trace edema, no calf tenderness

## 2024-09-24 NOTE — DISCHARGE SUMMARY
Discharge Summary    Admission Date: 2024  Discharge Date: 2024    Discharge Diagnosis  Normal labor ([facility])    Hospital Course  Delivery Date: 2024 2:23 PM  Delivery type: Vaginal, Spontaneous   GA at delivery: 39w3d  Outcome: Living  Anesthesia during delivery: Epidural  Intrapartum complications: None  Feeding method: Breastfeeding Status: Yes     Procedures:       Last Vitals:  Temp Pulse Resp BP MAP Pulse Ox   36.2 °C (97.2 °F) 86 18 107/66 72 98 %     Discharge Meds     Your medication list        START taking these medications        Instructions Last Dose Given Next Dose Due   acetaminophen 325 mg tablet  Commonly known as: Tylenol      Take 3 tablets (975 mg) by mouth every 6 hours for 14 days.       ibuprofen 600 mg tablet      Take 1 tablet (600 mg) by mouth every 6 hours for 14 days.       Stimulant Laxative Plus 8.6-50 mg tablet  Generic drug: sennosides-docusate sodium      Take 1 tablet by mouth once daily for 14 days.              CONTINUE taking these medications        Instructions Last Dose Given Next Dose Due   ferrous sulfate 325 (65 Fe) MG EC tablet           prenatal vit,calc76-iron-folic 29 mg iron- 1 mg tablet  Commonly known as: Prenatabs Rx                  STOP taking these medications      Autolet lancing device        blood-glucose meter misc                  Where to Get Your Medications        These medications were sent to [facility]  [address] , [address], [place] [address]      Hours: 8 AM to 6 PM Mon-Fri, 9 AM to 1 PM Saturday Phone: [phone]   acetaminophen 325 mg tablet  ibuprofen 600 mg tablet  Stimulant Laxative Plus 8.6-50 mg tablet          Complications Requiring Follow-Up  Postpartum hemorrhage   Acute blood loss anemia  Gestational diabetes    Test Results Pending At Discharge  Pending Labs       Order Current Status    CBC Collected (24)    Comprehensive metabolic panel Collected (24)    C. Trachomatis / N.  Gonorrhoeae, Amplified Detection In process    CALCIUM, IONIZED In process            Outpatient Follow-Up  4 weeks for postpartum visit      Josse Ruiz MD

## 2024-09-26 ENCOUNTER — TELEPHONE (OUTPATIENT)
Dept: OBSTETRICS AND GYNECOLOGY | Facility: CLINIC | Age: 39
End: 2024-09-26
Payer: COMMERCIAL

## 2024-10-22 ENCOUNTER — APPOINTMENT (OUTPATIENT)
Dept: OBSTETRICS AND GYNECOLOGY | Facility: CLINIC | Age: 39
End: 2024-10-22
Payer: COMMERCIAL

## 2024-11-11 ENCOUNTER — APPOINTMENT (OUTPATIENT)
Dept: OBSTETRICS AND GYNECOLOGY | Facility: CLINIC | Age: 39
End: 2024-11-11
Payer: COMMERCIAL

## 2024-11-11 VITALS
SYSTOLIC BLOOD PRESSURE: 102 MMHG | WEIGHT: 154 LBS | BODY MASS INDEX: 26.29 KG/M2 | DIASTOLIC BLOOD PRESSURE: 60 MMHG | HEIGHT: 64 IN

## 2024-11-11 DIAGNOSIS — Z13.31 SCREENING FOR DEPRESSION: ICD-10-CM

## 2024-11-11 DIAGNOSIS — Z86.32 HISTORY OF GESTATIONAL DIABETES: ICD-10-CM

## 2024-11-11 PROBLEM — O09.519 ADVANCED MATERNAL AGE, PRIMIGRAVIDA, ANTEPARTUM (HHS-HCC): Status: RESOLVED | Noted: 2024-08-14 | Resolved: 2024-11-11

## 2024-11-11 PROBLEM — O24.410 DIET CONTROLLED GESTATIONAL DIABETES MELLITUS (GDM), ANTEPARTUM (HHS-HCC): Status: RESOLVED | Noted: 2024-08-14 | Resolved: 2024-11-11

## 2024-11-11 PROCEDURE — 88142 CYTOPATH C/V THIN LAYER: CPT

## 2024-11-11 PROCEDURE — 87624 HPV HI-RISK TYP POOLED RSLT: CPT

## 2024-11-11 ASSESSMENT — EDINBURGH POSTNATAL DEPRESSION SCALE (EPDS)
I HAVE BLAMED MYSELF UNNECESSARILY WHEN THINGS WENT WRONG: NO, NEVER
THINGS HAVE BEEN GETTING ON TOP OF ME: NO, I HAVE BEEN COPING AS WELL AS EVER
I HAVE LOOKED FORWARD WITH ENJOYMENT TO THINGS: AS MUCH AS I EVER DID
I HAVE BEEN ABLE TO LAUGH AND SEE THE FUNNY SIDE OF THINGS: AS MUCH AS I ALWAYS COULD
TOTAL SCORE: 0
I HAVE FELT SCARED OR PANICKY FOR NO GOOD REASON: NO, NOT AT ALL
I HAVE BEEN ANXIOUS OR WORRIED FOR NO GOOD REASON: NO, NOT AT ALL
I HAVE FELT SAD OR MISERABLE: NO, NOT AT ALL
THE THOUGHT OF HARMING MYSELF HAS OCCURRED TO ME: NEVER
I HAVE BEEN SO UNHAPPY THAT I HAVE HAD DIFFICULTY SLEEPING: NOT AT ALL
I HAVE BEEN SO UNHAPPY THAT I HAVE BEEN CRYING: NO, NEVER

## 2024-11-11 NOTE — PROGRESS NOTES
Subjective     39 y.o.  presenting for postpartum follow-up     Delivery Date: 2024  GA at Delivery: 39  Type of Delivery: Vaginal, Spontaneous     Pregnancy Problems (from 24 to present)       Problem Noted Diagnosed Resolved    Advanced maternal age, primigravida, antepartum ([facility]) 2024 by Edwin Lomas MD  No    Priority:  Medium       Overview Addendum 9/3/2024  2:14 PM by BAR Loya-CNP     cffDNA at 34 week visit  Weekly  testing now until delivery given AMA          Diet controlled gestational diabetes mellitus (GDM), antepartum ([facility]) 2024 by Edwin Lomas MD  No    Priority:  Medium       Overview Addendum 9/10/2024  2:29 PM by BAR Justice-CNS     -Shared Care with Dr. Clancy/Cesilia  - MFM Consult completed-   -MFM 36 wk visit  -Serial growth ultrasounds starting at 28 weeks    Last ultrasound: :  EFW 64%, AC 85%, BPP - EFW 46%, AC 58%--> will reschedule growth for ASAP (org. )    Fetal surveillance: if medication initiated   -Weekly at 32 weeks  -Twice weekly at 36 weeks    Current Regimen: nutrition    Delivery Plan: Recommend 39 wk delivery- MOD --> pt considering primary . See counseling   Intrapartum:  GDM protocol  Postpartum: No medication    Recommend PP 2hr gtt and q3yr F/U with PCP for A1C and TSH     24 BG log reviewed and more than 80% within goal range--> nutrition   2024 Nutrition plan    2024 Nutrition plan    9/3/2024 : nutrition  9/10/2024 Nutrition plan             Third-stage postpartum hemorrhage ([facility]) 2024 by Ryan Velasco MD  No    38 weeks gestation of pregnancy ([facility]) 2024 by BAR Loya-CNP  2024 by BAR Lee-CNM    Priority:  Medium       Overview Addendum 9/10/2024  8:56 AM by Edwin Lomas MD     - Primary OB- Dr. Clancy  -Counseled on RSV vaccine today (9/3) given close to point where she would not be able to  receive this. Pt is considering. Will think about it. Aware that tomorrow () would be her last day eligible for vaccine.   -Desires to breastfeed   -Delivery planning- recommend 39 wk delivery. Pt asking for possibility of  delivery. Counseled on MOD and risk vs benefits of vaginal vs primary C/D (see note 2024)-- pt considering    Desired provider in labor: [] CNM  [] Physician  [x] Blood Products: [x] Yes, accepts [] No, needs counseling  [x] Initial BMI: 20.80   [x] Prenatal Labs: completed  [x] Cervical Cancer Screening up to date Needs PP PAP (last PAP 2018 ASCUS +HPV)  [x] Rh status:   A pos  [x] Genetic Screening:  did not complete cffDNA  [x] NT US: (11-13 wks) too late  [x] Baby ASA (if indicated): not offered  [x] Pregnancy dated by: LMP and ultrasound in [place] at 19 weeks    [x] Anatomy US: (19-20 wks): normal  [] Federal Sterilization consent signed (if indicated):  [x] 1hr GCT at 24-28wks: 189  3-hour GTT 90/189/160/88  [x] Rhogam (if indicated):  NA  [x] Fetal Surveillance (if indicated): weekly NSTs for AMA  [x] Tdap (27-32 wks, may be given up to 36 wks if initial window missed): given 2024  [x] RSV (32-36 wks) (Sept. to end of ):  NA  [] Flu Vaccine:    [x] Breastfeeding: yes  [] Postpartum Birth control method:   [x] GBS at 36 - 37 wks: neg  [x] 39 weeks discussion of IOL vs. Expectant management: IOL scheduled for [facility]  at 0800  [] Mode of delivery ( anticipated ):          Normal labor ([facility]) 2024 by Ryan Velasco MD  2024 by MYRTLE Lee            Concerns: Feeling     Pain: controlled  Lacerations:   Laceration Repaired?   Perineal: 1st Yes   Periurethral:       Labial:       Sulcus:       Vaginal: Yes Yes   Cervical:           Repair suture: 2-0 Synthetic Suture;3-0 Synthetic Suture   Number of repair packets:     Blood loss (mL):     Total estimated blood loss (mL): 0     Menses: No  Sexual Intimacy: No  Contraceptive Method: none  decided  Feeding Method: She is breast feeding with some supplementation. no breast or nursing problems  Lactation Consult Needed?: No  Birth Trauma: No  Bonding with Baby: well with Male [place]  Mood: EPDS = 0       Objective   There were no vitals filed for this visit.  Physical Exam  Constitutional:       Appearance: Normal appearance.   Abdominal:      General: Abdomen is flat. There is no distension.      Tenderness: There is no abdominal tenderness.   Genitourinary:     Labia:         Right: No rash, tenderness or lesion.         Left: No rash, tenderness or lesion.       Vagina: Normal. No vaginal discharge or bleeding.      Cervix: Normal.      Uterus: Normal. Not tender.    Neurological:      Mental Status: She is alert.          Assessment/Plan   Problem List Items Addressed This Visit    None      Normal/good postpartum recovery  1) Contraception: declines for now; options reviewed  2) EPDS: 0  3) Feeding: breast/bottle  4) PAP due: today  5) Other issues: 2-hour GTT ordered.  6) Mammogram after breast feeding completed.    Problem List Items Addressed This Visit       History of gestational diabetes    Relevant Orders    Glucose Mal, 2Hr Non-Pregnancy     Other Visit Diagnoses       Postpartum follow-up ([facility])    -  Primary    Relevant Orders    THINPREP PAP TEST    Screening for depression                 Edwin Lomas MD

## 2024-11-22 LAB
CYTOLOGY CMNT CVX/VAG CYTO-IMP: NORMAL
HPV HR 12 DNA GENITAL QL NAA+PROBE: NEGATIVE
HPV HR GENOTYPES PNL CVX NAA+PROBE: NEGATIVE
HPV16 DNA SPEC QL NAA+PROBE: NEGATIVE
HPV18 DNA SPEC QL NAA+PROBE: NEGATIVE
LAB AP HPV GENOTYPE QUESTION: YES
LAB AP HPV HR: NORMAL
LABORATORY COMMENT REPORT: NORMAL
LABORATORY COMMENT REPORT: NORMAL
PATH REPORT.TOTAL CANCER: NORMAL

## 2024-11-27 ENCOUNTER — LACTATION ENCOUNTER (OUTPATIENT)
Dept: PEDIATRICS | Facility: HOSPITAL | Age: 39
End: 2024-11-27

## 2024-11-27 NOTE — LACTATION NOTE
This note was copied from a baby's chart.  Lactation Consultant Note  Lactation Consultation  Reason for Consult: Initial assessment  Consultant Name: Chely Funes    Maternal Information  Has mother  before?: No    Maternal Assessment  Breast Assessment: Medium, Small  Nipple Assessment: Intact  [place] Assessment: Normal    Infant Assessment  Infant Behavior: Light sleep    Feeding Assessment  Nutrition Source: Breastmilk  Feeding Method: Nursing at the breast  Feeding Position: Cradle  Suck/Feeding: Sustained  Latch Assessment: Instructed on deep latch, Comfortable latch    LATCH TOOL  Latch: Grasps breast, tongue down, lips flanged, rhythmic sucking  Audible Swallowing: Spontaneous and intermittent (24 hours old)  Type of Nipple: Everted (After stimulation)  Comfort (Breast/Nipple): Soft/non-tender  Hold (Positioning): No assist from staff, mother able to position/hold infant  LATCH Score: 10    Breast Pump  Pump:  (Mom does have a pump at home and will only pump as needed.)    Other OB Lactation Tools       Patient Follow-up  Outpatient Lactation Follow-up: Recommended    Other OB Lactation Documentation       Recommendations/Summary  Mom states she will breastfeed every 2-3 hrs, infant is having 6-7 wet diapers and 2-3 stools daily. Infant sometimes may go 4 hrs overnight between feeds. Observed the end of breastfeeding session (see assessment). Encouraged mom to bring infant closer to her. Parents are ok with supplementing with formula or breastmilk after the breastfeeds. Dad going home to get some of the frozen milk. Set up a pump for mom at the bedside and showed her how to use it. Encouraged mom to pump 1-2x/day (morning and evening) It would be unreasonable for mom to pump after every breastfeed.  Lactation center information, [facility] breastfeeding hotline number and pumping/breastfeeding log given. Encouraged mom to seek out outpatient lactation after discharge.

## 2024-11-29 ENCOUNTER — LACTATION ENCOUNTER (OUTPATIENT)
Dept: PEDIATRICS | Facility: HOSPITAL | Age: 39
End: 2024-11-29

## 2024-11-29 ENCOUNTER — LAB (OUTPATIENT)
Dept: LAB | Facility: LAB | Age: 39
End: 2024-11-29
Payer: COMMERCIAL

## 2024-11-29 DIAGNOSIS — Z86.32 HISTORY OF GESTATIONAL DIABETES: ICD-10-CM

## 2024-11-29 LAB
GLUCOSE 2H P 75 G GLC PO SERPL-MCNC: 89 MG/DL
GLUCOSE P FAST SERPL-MCNC: 105 MG/DL

## 2024-11-29 PROCEDURE — 36415 COLL VENOUS BLD VENIPUNCTURE: CPT

## 2024-11-29 PROCEDURE — 82950 GLUCOSE TEST: CPT

## 2024-11-29 PROCEDURE — 82947 ASSAY GLUCOSE BLOOD QUANT: CPT

## 2024-11-29 NOTE — LACTATION NOTE
This note was copied from a baby's chart.  Lactation Consultant Note    Infant Assessment   Infant sleeping comfortably in mother's arm.    Breast Pump   Mom indicates she has a breast pump for home use.    Recommendations/Summary  Met with parents at Pheba's bedside. Parents report mom nurses Pheba and then he is fed a bottle of supplemental formula or expressed breastmilk after each feed. Mom indicates concern for her milk supply.  Mom encouraged to massage breasts before and during pumping. Instructed in hand expression/ massage techniques. Reviewed Foods That Promote Milk Production [email or web address]-to-promote-[email or web address] Referred to [facility] for additional lactation management. Invited to contact  services as needed. Breastfeeding discharge information:  Lactation Center Information & [facility] Breastfeeding Hotline & resource numbers.  Baby should nurse a minimum of 8-12 times in 24 hours (every 2-3 hours). Wake a sleepy baby every 3 hours to feed, even during the night. The more often you nurse, the more milk your body will produce. Burp your baby when switching sides and at the end of a feeding. Expect 6-8 diapers per day & 2-3 bowel movements per day.

## 2024-12-12 ENCOUNTER — DOCUMENTATION (OUTPATIENT)
Dept: OBSTETRICS AND GYNECOLOGY | Facility: CLINIC | Age: 39
End: 2024-12-12
Payer: COMMERCIAL

## 2024-12-12 ENCOUNTER — TELEPHONE (OUTPATIENT)
Dept: OBSTETRICS AND GYNECOLOGY | Facility: CLINIC | Age: 39
End: 2024-12-12
Payer: COMMERCIAL

## 2024-12-12 NOTE — PROGRESS NOTES
RTW paperwork completed and faxed  Documentation will be uploaded to [employer] [facility] RN